# Patient Record
Sex: MALE | Race: WHITE | NOT HISPANIC OR LATINO | Employment: OTHER | URBAN - METROPOLITAN AREA
[De-identification: names, ages, dates, MRNs, and addresses within clinical notes are randomized per-mention and may not be internally consistent; named-entity substitution may affect disease eponyms.]

---

## 2017-10-12 ENCOUNTER — OFFICE VISIT (OUTPATIENT)
Dept: FAMILY MEDICINE | Facility: CLINIC | Age: 72
End: 2017-10-12
Payer: COMMERCIAL

## 2017-10-12 ENCOUNTER — RADIANT APPOINTMENT (OUTPATIENT)
Dept: GENERAL RADIOLOGY | Facility: CLINIC | Age: 72
End: 2017-10-12
Attending: FAMILY MEDICINE
Payer: COMMERCIAL

## 2017-10-12 VITALS
WEIGHT: 259 LBS | DIASTOLIC BLOOD PRESSURE: 86 MMHG | HEIGHT: 70 IN | BODY MASS INDEX: 37.08 KG/M2 | HEART RATE: 75 BPM | SYSTOLIC BLOOD PRESSURE: 138 MMHG | TEMPERATURE: 97.8 F | OXYGEN SATURATION: 99 %

## 2017-10-12 DIAGNOSIS — Z11.59 NEED FOR HEPATITIS C SCREENING TEST: ICD-10-CM

## 2017-10-12 DIAGNOSIS — R10.32 ABDOMINAL PAIN, LEFT LOWER QUADRANT: Primary | ICD-10-CM

## 2017-10-12 DIAGNOSIS — Z91.81 AT RISK FOR FALLING: ICD-10-CM

## 2017-10-12 DIAGNOSIS — K59.00 CONSTIPATION, UNSPECIFIED CONSTIPATION TYPE: ICD-10-CM

## 2017-10-12 DIAGNOSIS — R10.32 ABDOMINAL PAIN, LEFT LOWER QUADRANT: ICD-10-CM

## 2017-10-12 DIAGNOSIS — I10 HYPERTENSION GOAL BP (BLOOD PRESSURE) < 140/90: ICD-10-CM

## 2017-10-12 DIAGNOSIS — Z23 NEED FOR PROPHYLACTIC VACCINATION AGAINST STREPTOCOCCUS PNEUMONIAE (PNEUMOCOCCUS): ICD-10-CM

## 2017-10-12 LAB
ALBUMIN UR-MCNC: NEGATIVE MG/DL
ANION GAP SERPL CALCULATED.3IONS-SCNC: 6 MMOL/L (ref 3–14)
APPEARANCE UR: CLEAR
BASOPHILS # BLD AUTO: 0 10E9/L (ref 0–0.2)
BASOPHILS NFR BLD AUTO: 0.4 %
BILIRUB UR QL STRIP: NEGATIVE
BUN SERPL-MCNC: 15 MG/DL (ref 7–30)
CALCIUM SERPL-MCNC: 9.1 MG/DL (ref 8.5–10.1)
CHLORIDE SERPL-SCNC: 98 MMOL/L (ref 94–109)
CHOLEST SERPL-MCNC: 164 MG/DL
CO2 SERPL-SCNC: 31 MMOL/L (ref 20–32)
COLOR UR AUTO: YELLOW
CREAT SERPL-MCNC: 1.01 MG/DL (ref 0.66–1.25)
DIFFERENTIAL METHOD BLD: NORMAL
EOSINOPHIL # BLD AUTO: 0.4 10E9/L (ref 0–0.7)
EOSINOPHIL NFR BLD AUTO: 3.9 %
ERYTHROCYTE [DISTWIDTH] IN BLOOD BY AUTOMATED COUNT: 12.6 % (ref 10–15)
GFR SERPL CREATININE-BSD FRML MDRD: 73 ML/MIN/1.7M2
GLUCOSE SERPL-MCNC: 264 MG/DL (ref 70–99)
GLUCOSE UR STRIP-MCNC: 100 MG/DL
HCT VFR BLD AUTO: 43.3 % (ref 40–53)
HCV AB SERPL QL IA: NONREACTIVE
HDLC SERPL-MCNC: 24 MG/DL
HGB BLD-MCNC: 15.6 G/DL (ref 13.3–17.7)
HGB UR QL STRIP: NEGATIVE
KETONES UR STRIP-MCNC: NEGATIVE MG/DL
LDLC SERPL CALC-MCNC: 69 MG/DL
LEUKOCYTE ESTERASE UR QL STRIP: NEGATIVE
LYMPHOCYTES # BLD AUTO: 2.4 10E9/L (ref 0.8–5.3)
LYMPHOCYTES NFR BLD AUTO: 26.1 %
MCH RBC QN AUTO: 32.4 PG (ref 26.5–33)
MCHC RBC AUTO-ENTMCNC: 36 G/DL (ref 31.5–36.5)
MCV RBC AUTO: 90 FL (ref 78–100)
MONOCYTES # BLD AUTO: 0.6 10E9/L (ref 0–1.3)
MONOCYTES NFR BLD AUTO: 6.3 %
NEUTROPHILS # BLD AUTO: 5.8 10E9/L (ref 1.6–8.3)
NEUTROPHILS NFR BLD AUTO: 63.3 %
NITRATE UR QL: NEGATIVE
NONHDLC SERPL-MCNC: 140 MG/DL
PH UR STRIP: 6 PH (ref 5–7)
PLATELET # BLD AUTO: 192 10E9/L (ref 150–450)
POTASSIUM SERPL-SCNC: 4.3 MMOL/L (ref 3.4–5.3)
RBC # BLD AUTO: 4.82 10E12/L (ref 4.4–5.9)
SODIUM SERPL-SCNC: 135 MMOL/L (ref 133–144)
SOURCE: ABNORMAL
SP GR UR STRIP: <=1.005 (ref 1–1.03)
TRIGL SERPL-MCNC: 355 MG/DL
UROBILINOGEN UR STRIP-ACNC: 0.2 EU/DL (ref 0.2–1)
WBC # BLD AUTO: 9.2 10E9/L (ref 4–11)

## 2017-10-12 PROCEDURE — 85025 COMPLETE CBC W/AUTO DIFF WBC: CPT | Performed by: FAMILY MEDICINE

## 2017-10-12 PROCEDURE — 86803 HEPATITIS C AB TEST: CPT | Performed by: FAMILY MEDICINE

## 2017-10-12 PROCEDURE — 80061 LIPID PANEL: CPT | Performed by: FAMILY MEDICINE

## 2017-10-12 PROCEDURE — 36415 COLL VENOUS BLD VENIPUNCTURE: CPT | Performed by: FAMILY MEDICINE

## 2017-10-12 PROCEDURE — 81003 URINALYSIS AUTO W/O SCOPE: CPT | Performed by: FAMILY MEDICINE

## 2017-10-12 PROCEDURE — 99214 OFFICE O/P EST MOD 30 MIN: CPT | Performed by: FAMILY MEDICINE

## 2017-10-12 PROCEDURE — 74020 XR ABDOMEN 2 VW: CPT

## 2017-10-12 PROCEDURE — 80048 BASIC METABOLIC PNL TOTAL CA: CPT | Performed by: FAMILY MEDICINE

## 2017-10-12 RX ORDER — METRONIDAZOLE 500 MG/1
500 TABLET ORAL 3 TIMES DAILY
Qty: 30 TABLET | Refills: 0 | Status: SHIPPED | OUTPATIENT
Start: 2017-10-12 | End: 2017-10-22

## 2017-10-12 RX ORDER — CEFUROXIME AXETIL 500 MG/1
500 TABLET ORAL 2 TIMES DAILY
Qty: 20 TABLET | Refills: 0 | Status: SHIPPED | OUTPATIENT
Start: 2017-10-12 | End: 2017-10-22

## 2017-10-12 NOTE — PROGRESS NOTES
SUBJECTIVE:                                                    Neville Bonilla is a 72 year old male who presents to clinic today for the following health issues:  No abdominal pain    ABDOMINAL   PAIN     Onset: 3 days    Description:   Character: Sharp  Location: left lower quadrant  Radiation: None    Intensity: moderate    Progression of Symptoms:  worsening    Accompanying Signs & Symptoms:  Fever/Chills?: no   Gas/Bloating: no   Nausea: no   Vomitting: no   Diarrhea?: no   Constipation:YES- has Not had a Bowel Movement for 4 days  Dysuria or Hematuria: no    History:   Trauma: no   Previous similar pain: no    Previous tests done: none    Precipitating factors:   Does the pain change with:     Food: no      BM: no     Urination: no     Alleviating factors:      Therapies Tried and outcome: none             Hypertension Follow-up      Outpatient blood pressures are not being checked.    Low Salt Diet: no added salt          Problem list and histories reviewed & adjusted, as indicated.  Additional history: as documented    Patient Active Problem List   Diagnosis     CARDIOVASCULAR SCREENING; LDL GOAL LESS THAN 130     Hypertension goal BP (blood pressure) < 140/90     Obesity     HDL deficiency     Tear of medial meniscus of knee     S/P arthroscopy of right knee     Past Surgical History:   Procedure Laterality Date      KNEE SCOPE,MED/LAT MENISECTOMY  10/23/13    Right, with partial medial ONLY, chondoplasty      NASAL SURG PROC UNLISTED  1978     ROTATOR CUFF REPAIR RT/LT  1/29/1999    Left     TONSILLECTOMY & ADENOIDECTOMY  Age 13     URETEROSTOMY  8/2001       Social History   Substance Use Topics     Smoking status: Former Smoker     Years: 30.00     Quit date: 8/10/1993     Smokeless tobacco: Never Used     Alcohol use No     Family History   Problem Relation Age of Onset     HEART DISEASE Mother      DIABETES Father      Asthma Brother      Thyroid Disease Sister          Current Outpatient  "Prescriptions   Medication Sig Dispense Refill     NAPROXEN PO Take 250 mg by mouth       lisinopril-hydrochlorothiazide (PRINZIDE,ZESTORETIC) 10-12.5 MG per tablet Take 1 tablet by mouth daily       triamcinolone (NASACORT) 55 MCG/ACT nasal inhaler Spray 2 sprays into both nostrils 2 times daily 1 Bottle 1     aspirin 325 MG tablet Take by mouth at onset of headache       Multiple Vitamin (MULTI-VITAMIN) per tablet Take 1 tablet by mouth daily.       No Known Allergies  Recent Labs   Lab Test  10/17/13   1057  04/27/11   1227  08/17/10   0709   A1C   --    --   5.9   LDL   --    --   111   HDL   --    --   22*   TRIG   --    --   268*   CR  0.95  0.96  1.09   GFRESTIMATED  79  79  68   GFRESTBLACK  >90  >90  82   POTASSIUM  4.0  3.9  4.4      BP Readings from Last 3 Encounters:   10/12/17 (!) 140/100   04/09/16 133/79   03/08/16 140/80    Wt Readings from Last 3 Encounters:   10/12/17 259 lb (117.5 kg)   04/09/16 256 lb 12.8 oz (116.5 kg)   03/08/16 261 lb 6.4 oz (118.6 kg)                  Labs reviewed in EPIC          ROS:  C: NEGATIVE for fever, chills, change in weight  INTEGUMENTARY/SKIN: NEGATIVE for worrisome rashes, moles or lesions  E/M: NEGATIVE for ear, mouth and throat problems  R: NEGATIVE for significant cough or SOB  CV: NEGATIVE for chest pain, palpitations or peripheral edema  GI: as above  : negative for dysuria, hematuria, decreased urinary stream    OBJECTIVE:     BP (!) 140/100  Pulse 75  Temp 97.8  F (36.6  C) (Oral)  Ht 5' 10\" (1.778 m)  Wt 259 lb (117.5 kg)  SpO2 99%  BMI 37.16 kg/m2  Body mass index is 37.16 kg/(m^2).  GENERAL: healthy, alert and no distress  NECK: no adenopathy, no asymmetry, masses, or scars and thyroid normal to palpation  RESP: lungs clear to auscultation - no rales, rhonchi or wheezes  CV: regular rate and rhythm, normal S1 S2, no S3 or S4, no murmur, click or rub, no peripheral edema and peripheral pulses strong  ABDOMEN: tenderness LLQ, no organomegaly or " masses and bowel sounds normal  MS: no gross musculoskeletal defects noted, no edema  PSYCH: mentation appears normal, affect normal/bright    Diagnostic Test Results:  Results for orders placed or performed in visit on 10/12/17 (from the past 24 hour(s))   *UA reflex to Microscopic and Culture ([facility] (except [place] and [place])   Result Value Ref Range    Color Urine Yellow     Appearance Urine Clear     Glucose Urine 100 (A) NEG^Negative mg/dL    Bilirubin Urine Negative NEG^Negative    Ketones Urine Negative NEG^Negative mg/dL    Specific Gravity Urine <=1.005 1.003 - 1.035    Blood Urine Negative NEG^Negative    pH Urine 6.0 5.0 - 7.0 pH    Protein Albumin Urine Negative NEG^Negative mg/dL    Urobilinogen Urine 0.2 0.2 - 1.0 EU/dL    Nitrite Urine Negative NEG^Negative    Leukocyte Esterase Urine Negative NEG^Negative    Source Midstream Urine    CBC with platelets differential   Result Value Ref Range    WBC 9.2 4.0 - 11.0 10e9/L    RBC Count 4.82 4.4 - 5.9 10e12/L    Hemoglobin 15.6 13.3 - 17.7 g/dL    Hematocrit 43.3 40.0 - 53.0 %    MCV 90 78 - 100 fl    MCH 32.4 26.5 - 33.0 pg    MCHC 36.0 31.5 - 36.5 g/dL    RDW 12.6 10.0 - 15.0 %    Platelet Count 192 150 - 450 10e9/L    Diff Method Automated Method     % Neutrophils 63.3 %    % Lymphocytes 26.1 %    % Monocytes 6.3 %    % Eosinophils 3.9 %    % Basophils 0.4 %    Absolute Neutrophil 5.8 1.6 - 8.3 10e9/L    Absolute Lymphocytes 2.4 0.8 - 5.3 10e9/L    Absolute Monocytes 0.6 0.0 - 1.3 10e9/L    Absolute Eosinophils 0.4 0.0 - 0.7 10e9/L    Absolute Basophils 0.0 0.0 - 0.2 10e9/L       ASSESSMENT/PLAN:               1. Abdominal pain, left lower quadrant  Possible Diverticulitis   - CBC with platelets differential  - *UA reflex to Microscopic and Culture ([facility] (except [place] and [place])  - XR Abdomen 2 Views; Future  - cefuroxime (CEFTIN) 500 MG tablet; Take 1 tablet (500 mg) by mouth 2 times daily for 10  days  Dispense: 20 tablet; Refill: 0  - metroNIDAZOLE (FLAGYL) 500 MG tablet; Take 1 tablet (500 mg) by mouth 3 times daily for 10 days  Dispense: 30 tablet; Refill: 0  SEE EPIC care orders  The potential side effects of this medication have been discussed with the patient.  Call if any significant problems with these are experienced.  Liquid diet  Follow up PMD 2-3 days   If worse go to ER  For a CT scan   Pt declined CT scan Today  2. Hypertension goal BP (blood pressure) < 140/90  Was High  Pt on a diffferent BP medicines   [facility]  Advised To bring all his medicines and follow up with PMD next week for Blood Pressure Check  - BASIC METABOLIC PANEL  - Lipid panel reflex to direct LDL    3. Constipation, unspecified constipation type      4. Need for hepatitis C screening test    - Hepatitis C Screen Reflex to HCV RNA Quant and Genotype    5. At risk for falling    Ria Garibay MD  [facility]

## 2017-10-12 NOTE — PATIENT INSTRUCTIONS
[facility]    If you have any questions regarding to your visit please contact your care team:       Team Red:   Clinic Hours Telephone Number   Dr. Kelly David, NP   7am-7pm  Monday - Thursday   7am-5pm  Fridays  [phone]  (Appointment scheduling available 24/7)    Questions about your visit?   Team Line  [phone]   [facility] [place] [facility] 11am-9pm Monday-Friday Saturday-Sunday- 9am-5pm   [place] - 5pm-9pm Monday-Friday Saturday-Sunday- 9am-5pm  [phone] - [place]   [phone] - [place]       What options do I have for visits at the clinic other than the traditional office visit?  To expand how we care for you, many of our providers are utilizing electronic visits (e-visits) and telephone visits, when medically appropriate, for interactions with their patients rather than a visit in the clinic.   We also offer nurse visits for many medical concerns. Just like any other service, we will bill your insurance company for this type of visit based on time spent on the phone with your provider. Not all insurance companies cover these visits. Please check with your medical insurance if this type of visit is covered. You will be responsible for any charges that are not paid by your insurance.      E-visits via Sevence:  generally incur a $35.00 fee.  Telephone visits:  Time spent on the phone: *charged based on time that is spent on the phone in increments of 10 minutes. Estimated cost:   5-10 mins $30.00   11-20 mins. $59.00   21-30 mins. $85.00     Use Breezeworkst (secure email communication and access to your chart) to send your primary care provider a message or make an appointment. Ask someone on your Team how to sign up for Sevence.  For a Price Quote for your services, please call our Consumer Price Line at [phone].      As always, Thank you for trusting us with your health care needs!    Dawn  Tej, CMA

## 2017-10-12 NOTE — PROGRESS NOTES
Writer called pt regarding follow up appt with Dr ODOM as requested pt stated he would get around to scheduling appt some time soon again writer offered to schedule pt declined .      Dawn Burnham, JACLYN

## 2017-10-12 NOTE — NURSING NOTE
"Chief Complaint   Patient presents with     Hernia     PELVIC       Initial BP (!) 140/100  Pulse 75  Temp 97.8  F (36.6  C) (Oral)  Ht 5' 10\" (1.778 m)  Wt 259 lb (117.5 kg)  SpO2 99%  BMI 37.16 kg/m2 Estimated body mass index is 37.16 kg/(m^2) as calculated from the following:    Height as of this encounter: 5' 10\" (1.778 m).    Weight as of this encounter: 259 lb (117.5 kg).  Medication Reconciliation: complete       Dawn Burnham CMA      "

## 2017-10-12 NOTE — MR AVS SNAPSHOT
After Visit Summary   10/12/2017    Neville Bonilla    MRN: [id number]           Patient Information     Date Of Birth          1945        Visit Information        Provider Department      10/12/2017 10:20 AM Ria Garibay MD [facility]        Today's Diagnoses     Abdominal pain, left lower quadrant    -  1    Hypertension goal BP (blood pressure) < 140/90        Need for hepatitis C screening test        At risk for falling        Need for prophylactic vaccination against Streptococcus pneumoniae (pneumococcus)          Care Instructions    [facility]    If you have any questions regarding to your visit please contact your care team:       Team Red:   Clinic Hours Telephone Number   Dr. Kelly David, NP   7am-7pm  Monday - Thursday   7am-5pm  Fridays  [phone]  (Appointment scheduling available 24/7)    Questions about your visit?   Team Line  [phone]   [facility] [place] [facility] 11am-9pm Monday-Friday Saturday-Sunday- 9am-5pm   [place] - 5pm-9pm Monday-Friday Saturday-Sunday- 9am-5pm  [phone] - [facility]  [phone] - [place]       What options do I have for visits at the clinic other than the traditional office visit?  To expand how we care for you, many of our providers are utilizing electronic visits (e-visits) and telephone visits, when medically appropriate, for interactions with their patients rather than a visit in the clinic.   We also offer nurse visits for many medical concerns. Just like any other service, we will bill your insurance company for this type of visit based on time spent on the phone with your provider. Not all insurance companies cover these visits. Please check with your medical insurance if this type of visit is covered. You will be responsible for any charges that are not paid by your insurance.      E-visits via Perpetuall:  generally incur a  "$35.00 fee.  Telephone visits:  Time spent on the phone: *charged based on time that is spent on the phone in increments of 10 minutes. Estimated cost:   5-10 mins $30.00   11-20 mins. $59.00   21-30 mins. $85.00     Use Braingazehart (secure email communication and access to your chart) to send your primary care provider a message or make an appointment. Ask someone on your Team how to sign up for Flavourlyt.  For a Price Quote for your services, please call our [place] Line at [phone].      As always, Thank you for trusting us with your health care needs!    Dawn Burnham, JACLYN            Follow-ups after your visit        Who to contact     If you have questions or need follow up information about today's clinic visit or your schedule please contact [facility] directly at [phone].  Normal or non-critical lab and imaging results will be communicated to you by Braingazehart, letter or phone within 4 business days after the clinic has received the results. If you do not hear from us within 7 days, please contact the clinic through Braingazehart or phone. If you have a critical or abnormal lab result, we will notify you by phone as soon as possible.  Submit refill requests through PredictionIO or call your pharmacy and they will forward the refill request to us. Please allow 3 business days for your refill to be completed.          Additional Information About Your Visit        Braingazehart Information     Flavourlyt lets you send messages to your doctor, view your test results, renew your prescriptions, schedule appointments and more. To sign up, go to [email or web address] . Click on \"Log in\" on the left side of the screen, which will take you to the Welcome page. Then click on \"Sign up Now\" on the right side of the page.     You will be asked to enter the access code listed below, as well as some personal information. Please follow the directions to create your username and password.     Your access code is: " [email or web address]  Expires: 1/10/2018 11:43 AM     Your access code will  in 90 days. If you need help or a new code, please call your [place] clinic or [phone].        Care EveryWhere ID     This is your Care EveryWhere ID. This could be used by other organizations to access your [place] medical records  [id number]        Your Vitals Were     Pulse Temperature Height Pulse Oximetry BMI (Body Mass Index)       75 97.8  F (36.6  C) (Oral) 5' 10\" (1.778 m) 99% 37.16 kg/m2        Blood Pressure from Last 3 Encounters:   10/12/17 (!) 140/100   16 133/79   16 140/80    Weight from Last 3 Encounters:   10/12/17 259 lb (117.5 kg)   16 256 lb 12.8 oz (116.5 kg)   16 261 lb 6.4 oz (118.6 kg)              We Performed the Following     *UA reflex to Microscopic and Culture ([facility] (except [place] and [place])     BASIC METABOLIC PANEL     CBC with platelets differential     Hepatitis C Screen Reflex to HCV RNA Quant and Genotype     Lipid panel reflex to direct LDL          Today's Medication Changes          These changes are accurate as of: 10/12/17 11:43 AM.  If you have any questions, ask your nurse or doctor.               Start taking these medicines.        Dose/Directions    cefuroxime 500 MG tablet   Commonly known as:  CEFTIN   Used for:  Abdominal pain, left lower quadrant   Started by:  Ria Garibay MD        Dose:  500 mg   Take 1 tablet (500 mg) by mouth 2 times daily for 10 days   Quantity:  20 tablet   Refills:  0       metroNIDAZOLE 500 MG tablet   Commonly known as:  FLAGYL   Used for:  Abdominal pain, left lower quadrant   Started by:  Ria Garibay MD        Dose:  500 mg   Take 1 tablet (500 mg) by mouth 3 times daily for 10 days   Quantity:  30 tablet   Refills:  0            Where to get your medicines      These medications were sent to [facility] - [address]  [address], [place] [id number]     Phone:  [phone]     cefuroxime 500 MG tablet    metroNIDAZOLE 500 MG tablet                Primary Care Provider Office Phone # Fax #    Rich Butler [phone] [address] [address]        Equal Access to Services     BHARATH POWELL : Hadii aad ku hadasho Soomaali, waaxda luqadaha, qaybta kaalmada adeegyada, sue harris alexgayatri leonard scoutralph chauhan. So [facility] [phone].    ATENCIÓN: Si habla español, tiene a banks disposición servicios gratuitos de asistencia lingüística. [facility] [phone].    We comply with applicable federal civil rights laws and [place] laws. We do not discriminate on the basis of race, color, national origin, age, disability, sex, sexual orientation, or gender identity.            Thank you!     Thank you for choosing [facility]  for your care. Our goal is always to provide you with excellent care. Hearing back from our patients is one way we can continue to improve our services. Please take a few minutes to complete the written survey that you may receive in the mail after your visit with us. Thank you!             Your Updated Medication List - Protect others around you: Learn how to safely use, store and throw away your medicines at www.disposemymeds.org.          This list is accurate as of: 10/12/17 11:43 AM.  Always use your most recent med list.                   Brand Name Dispense Instructions for use Diagnosis    aspirin 325 MG tablet      Take by mouth at onset of headache        cefuroxime 500 MG tablet    CEFTIN    20 tablet    Take 1 tablet (500 mg) by mouth 2 times daily for 10 days    Abdominal pain, left lower quadrant       lisinopril-hydrochlorothiazide 10-12.5 MG per tablet    PRINZIDE/ZESTORETIC     Take 1 tablet by mouth daily        metroNIDAZOLE 500 MG tablet    FLAGYL    30 tablet    Take 1 tablet (500 mg) by mouth 3 times daily for 10 days    Abdominal pain, left lower quadrant        Multi-vitamin Tabs tablet   Generic drug:  multivitamin, therapeutic with minerals      Take 1 tablet by mouth daily.    Hypertension goal BP (blood pressure) < 140/90       NAPROXEN PO      Take 250 mg by mouth        triamcinolone 55 MCG/ACT Inhaler    NASACORT    1 Bottle    Spray 2 sprays into both nostrils 2 times daily    Acute sinusitis, recurrence not specified, unspecified location, Cough

## 2017-10-13 PROBLEM — E11.9 TYPE 2 DIABETES MELLITUS WITHOUT COMPLICATION (H): Status: ACTIVE | Noted: 2017-10-13

## 2017-10-16 ENCOUNTER — OFFICE VISIT (OUTPATIENT)
Dept: FAMILY MEDICINE | Facility: CLINIC | Age: 72
End: 2017-10-16
Payer: COMMERCIAL

## 2017-10-16 VITALS
BODY MASS INDEX: 36.3 KG/M2 | DIASTOLIC BLOOD PRESSURE: 80 MMHG | SYSTOLIC BLOOD PRESSURE: 140 MMHG | OXYGEN SATURATION: 97 % | WEIGHT: 253 LBS | TEMPERATURE: 97.3 F | HEART RATE: 73 BPM

## 2017-10-16 DIAGNOSIS — K57.32 DIVERTICULITIS OF COLON: Primary | ICD-10-CM

## 2017-10-16 PROCEDURE — 99213 OFFICE O/P EST LOW 20 MIN: CPT | Performed by: FAMILY MEDICINE

## 2017-10-16 RX ORDER — LOSARTAN POTASSIUM 100 MG/1
TABLET ORAL
COMMUNITY
Start: 2017-09-19

## 2017-10-16 NOTE — PATIENT INSTRUCTIONS
Diverticulitis    Some people get pouches along the wall of the colon as they get older. The pouches, called diverticuli, usually cause no symptoms. If the pouches become blocked, you can get an infection. This infection is called diverticulitis. It causes pain in your lower abdomen and fever. If not treated, it can become a serious condition, causing an abscess to form inside the pouch. The abscess may block the intestinal tract even or rupture, spreading infection throughout the abdomen.  When treatment is started early, oral antibiotics alone may be enough to cure diverticulitis. This method is tried first. But, if you don't improve or if your condition gets worse while using oral antibiotics, you may need to be admitted to the hospital for IV antibiotics. Severe cases may require surgery.  Home care  The following guidelines will help you care for yourself at home:    During the acute illness, rest and follow your healthcare provider's instructions about diet. Sometimes you will need to follow a clear liquid diet to rest your bowel. Once your symptoms are better, you may be told to follow a low-fiber diet for some time. Include foods like:    Flake cereal, mashed potatoes, pancakes, waffles, pasta, white bread, rice, applesauce, bananas, eggs, fish, poultry, tofu, and cooked soft vegetables    Take antibiotics exactly as instructed. Don't miss any doses or stop taking the medication, even if you feel better.    Monitor your temperature and tell your healthcare provider if you have rising temperatures.  Preventing future attacks  Once you have an episode of diverticulitis, you are at risk for having it again. After you have recovered from this episode, you may be able to lower your risk by eating a high-fiber diet (20 gm/day to 35 gm/day of fiber). This cleans out the colon pouches that already exist and may prevent new ones from forming. Foods high in fiber include fresh fruits and edible peelings, raw or  lightly cooked vegetables, whole grain cereals and breads, dried beans and peas, and bran.  Other steps that can help prevent future attacks include:    Take your medicines, such as antibiotics, as your healthcare provider says.    Drink 6 to 8 glasses of water every day, unless told otherwise.    Use a heating pad or hot water bottle to help abdominal cramping or pain.    Begin an exercise program. Ask your healthcare provider how to get started. You can benefit from simple activities such as walking or gardening.    Treat diarrhea with a bland diet. Start with liquids only; then slowly add fiber over time.    Watch for changes in your bowel movements (constipation to diarrhea). Avoid constipation by eating a high fiber diet and taking a stool softener if needed.    Get plenty of rest and sleep.  Follow-up care  Follow up with your healthcare provider as advised or sooner if you are not getting better in the next 2 days.  When to seek medical advice  Call your healthcare provider right away if any of these occur:    Fever of 100.4 F (38 C) or higher, or as directed by your healthcare provider    Repeated vomiting or swelling of the abdomen    Weakness, dizziness, light-headedness    Pain in your abdomen that gets worse, severe, or spreads to your back    Pain that moves to the right lower abdomen    Rectal bleeding (stools that are red, black or maroon color)    Unexpected vaginal bleeding  Date Last Reviewed: 9/1/2016 2000-2017 [employer]. [address] [place] [address]. All rights reserved. This information is not intended as a substitute for professional medical care. Always follow your healthcare professional's instructions.

## 2017-10-16 NOTE — PROGRESS NOTES
SUBJECTIVE:   Neville Bonilla is a 72 year old male who presents to clinic today for the following health issues:    Follow up from Dr. Garibay on 10/12/2017  ABDOMINAL   PAIN     Onset: x1 week     Description:   Character: Sharp  Location: left lower quadrant  Radiation: Down the left leg     Intensity: mild    Progression of Symptoms:  worsening    Accompanying Signs & Symptoms:  Fever/Chills?: no   Gas/Bloating: no   Nausea: no   Vomitting: no   Diarrhea?: no   Constipation:YES  Dysuria or Hematuria: no    History:   Trauma: no   Previous similar pain: no    Previous tests done: x-ray    Precipitating factors:   Does the pain change with:     Food: YES- sometimes     BM: no     Urination: no     Alleviating factors:  Meds are helping     Therapies Tried and outcome: meds     LMP:  not applicable       Current Outpatient Prescriptions   Medication Sig Dispense Refill     losartan (COZAAR) 100 MG tablet        cefuroxime (CEFTIN) 500 MG tablet Take 1 tablet (500 mg) by mouth 2 times daily for 10 days 20 tablet 0     metroNIDAZOLE (FLAGYL) 500 MG tablet Take 1 tablet (500 mg) by mouth 3 times daily for 10 days 30 tablet 0     triamcinolone (NASACORT) 55 MCG/ACT nasal inhaler Spray 2 sprays into both nostrils 2 times daily 1 Bottle 1     Multiple Vitamin (MULTI-VITAMIN) per tablet Take 1 tablet by mouth daily.       NAPROXEN PO Take 250 mg by mouth       lisinopril-hydrochlorothiazide (PRINZIDE,ZESTORETIC) 10-12.5 MG per tablet Take 1 tablet by mouth daily       aspirin 325 MG tablet Take by mouth at onset of headache       Patient did not have a bowel obstruction       Problem list and histories reviewed & adjusted, as indicated.  Additional history: as documented    Current Outpatient Prescriptions   Medication Sig Dispense Refill     losartan (COZAAR) 100 MG tablet        cefuroxime (CEFTIN) 500 MG tablet Take 1 tablet (500 mg) by mouth 2 times daily for 10 days 20 tablet 0     metroNIDAZOLE (FLAGYL) 500 MG tablet  Take 1 tablet (500 mg) by mouth 3 times daily for 10 days 30 tablet 0     triamcinolone (NASACORT) 55 MCG/ACT nasal inhaler Spray 2 sprays into both nostrils 2 times daily 1 Bottle 1     Multiple Vitamin (MULTI-VITAMIN) per tablet Take 1 tablet by mouth daily.       NAPROXEN PO Take 250 mg by mouth       lisinopril-hydrochlorothiazide (PRINZIDE,ZESTORETIC) 10-12.5 MG per tablet Take 1 tablet by mouth daily       aspirin 325 MG tablet Take by mouth at onset of headache       No Known Allergies  Recent Labs   Lab Test  10/12/17   1048  10/17/13   1057   08/17/10   0709   A1C   --    --    --   5.9   LDL  69   --    --   111   HDL  24*   --    --   22*   TRIG  355*   --    --   268*   CR  1.01  0.95   < >  1.09   GFRESTIMATED  73  79   < >  68   GFRESTBLACK  88  >90   < >  82   POTASSIUM  4.3  4.0   < >  4.4    < > = values in this interval not displayed.      BP Readings from Last 3 Encounters:   10/16/17 158/89   10/12/17 138/86   04/09/16 133/79    Wt Readings from Last 3 Encounters:   10/16/17 253 lb (114.8 kg)   10/12/17 259 lb (117.5 kg)   04/09/16 256 lb 12.8 oz (116.5 kg)          O; /80  Pulse 73  Temp 97.3  F (36.3  C) (Oral)  Wt 253 lb (114.8 kg)  SpO2 97%  BMI 36.3 kg/m2    Chest wall normal to inspection and palpation. Good excursion bilaterally. Lungs clear to auscultation. Good air movement bilaterally without rales, wheezes, or rhonchi.   Regular rate and  rhythm. S1 and S2 normal, no murmurs, clicks, gallops or rubs. No edema or JVD.    The abdomen is soft with LLQ tenderness,no  guarding, mass,slight rebound or organomegaly. Bowel sounds are normal. No CVA tenderness or inguinal adenopathy noted.            ICD-10-CM    1. Diverticulitis of colon K57.32      [facility]   [facility]      Reviewed and updated as needed this visit by clinical staff     Reviewed and updated as needed this visit by Provider

## 2017-10-16 NOTE — MR AVS SNAPSHOT
After Visit Summary   10/16/2017    Neville Bonilla    MRN: [id number]           Patient Information     Date Of Birth          1945        Visit Information        Provider Department      10/16/2017 9:40 AM Rich Butler MD [facility]        Today's Diagnoses     Diverticulitis of colon    -  1      Care Instructions      Diverticulitis    Some people get pouches along the wall of the colon as they get older. The pouches, called diverticuli, usually cause no symptoms. If the pouches become blocked, you can get an infection. This infection is called diverticulitis. It causes pain in your lower abdomen and fever. If not treated, it can become a serious condition, causing an abscess to form inside the pouch. The abscess may block the intestinal tract even or rupture, spreading infection throughout the abdomen.  When treatment is started early, oral antibiotics alone may be enough to cure diverticulitis. This method is tried first. But, if you don't improve or if your condition gets worse while using oral antibiotics, you may need to be admitted to the hospital for IV antibiotics. Severe cases may require surgery.  Home care  The following guidelines will help you care for yourself at home:    During the acute illness, rest and follow your healthcare provider's instructions about diet. Sometimes you will need to follow a clear liquid diet to rest your bowel. Once your symptoms are better, you may be told to follow a low-fiber diet for some time. Include foods like:    Flake cereal, mashed potatoes, pancakes, waffles, pasta, white bread, rice, applesauce, bananas, eggs, fish, poultry, tofu, and cooked soft vegetables    Take antibiotics exactly as instructed. Don't miss any doses or stop taking the medication, even if you feel better.    Monitor your temperature and tell your healthcare provider if you have rising temperatures.  Preventing future attacks  Once you have an  episode of diverticulitis, you are at risk for having it again. After you have recovered from this episode, you may be able to lower your risk by eating a high-fiber diet (20 gm/day to 35 gm/day of fiber). This cleans out the colon pouches that already exist and may prevent new ones from forming. Foods high in fiber include fresh fruits and edible peelings, raw or lightly cooked vegetables, whole grain cereals and breads, dried beans and peas, and bran.  Other steps that can help prevent future attacks include:    Take your medicines, such as antibiotics, as your healthcare provider says.    Drink 6 to 8 glasses of water every day, unless told otherwise.    Use a heating pad or hot water bottle to help abdominal cramping or pain.    Begin an exercise program. Ask your healthcare provider how to get started. You can benefit from simple activities such as walking or gardening.    Treat diarrhea with a bland diet. Start with liquids only; then slowly add fiber over time.    Watch for changes in your bowel movements (constipation to diarrhea). Avoid constipation by eating a high fiber diet and taking a stool softener if needed.    Get plenty of rest and sleep.  Follow-up care  Follow up with your healthcare provider as advised or sooner if you are not getting better in the next 2 days.  When to seek medical advice  Call your healthcare provider right away if any of these occur:    Fever of 100.4 F (38 C) or higher, or as directed by your healthcare provider    Repeated vomiting or swelling of the abdomen    Weakness, dizziness, light-headedness    Pain in your abdomen that gets worse, severe, or spreads to your back    Pain that moves to the right lower abdomen    Rectal bleeding (stools that are red, black or maroon color)    Unexpected vaginal bleeding  Date Last Reviewed: 9/1/2016 2000-2017 [employer]. [address] [place] [address], [place] [address]. All rights reserved. This information is not intended  "as a substitute for professional medical care. Always follow your healthcare professional's instructions.                Follow-ups after your visit        Who to contact     If you have questions or need follow up information about today's clinic visit or your schedule please contact [facility] directly at [phone].  Normal or non-critical lab and imaging results will be communicated to you by MyChart, letter or phone within 4 business days after the clinic has received the results. If you do not hear from us within 7 days, please contact the clinic through MyChart or phone. If you have a critical or abnormal lab result, we will notify you by phone as soon as possible.  Submit refill requests through [employer] or call your pharmacy and they will forward the refill request to us. Please allow 3 business days for your refill to be completed.          Additional Information About Your Visit        MyChart Information     [employer] lets you send messages to your doctor, view your test results, renew your prescriptions, schedule appointments and more. To sign up, go to [email or web address] . Click on \"Log in\" on the left side of the screen, which will take you to the Welcome page. Then click on \"Sign up Now\" on the right side of the page.     You will be asked to enter the access code listed below, as well as some personal information. Please follow the directions to create your username and password.     Your access code is: [email or web address]  Expires: 1/10/2018 11:43 AM     Your access code will  in 90 days. If you need help or a new code, please call your [facility] or [phone].        Care EveryWhere ID     This is your Care EveryWhere ID. This could be used by other organizations to access your [place] medical records  [id number]        Your Vitals Were     Pulse Temperature Pulse Oximetry BMI (Body Mass Index)          73 97.3  F (36.3  C) (Oral) 97% 36.3 kg/m2         Blood " Pressure from Last 3 Encounters:   10/16/17 140/80   10/12/17 138/86   04/09/16 133/79    Weight from Last 3 Encounters:   10/16/17 253 lb (114.8 kg)   10/12/17 259 lb (117.5 kg)   04/09/16 256 lb 12.8 oz (116.5 kg)              Today, you had the following     No orders found for display       Primary Care Provider Office Phone # Fax #    Rich Butler [phone]       [address] [facility] [address]        Equal Access to Services     [facility]: Hadii nyasia marcial hadjarvis Socheyenne, waaxda luqadaha, qaybta kaalmada clifton, sue rizzo . So [facility] [phone].    ATENCIÓN: Si habla español, tiene a banks disposición servicios gratuitos de asistencia lingüística. Llame al [phone].    We comply with applicable federal civil rights laws and [place] laws. We do not discriminate on the basis of race, color, national origin, age, disability, sex, sexual orientation, or gender identity.            Thank you!     Thank you for choosing [facility]  for your care. Our goal is always to provide you with excellent care. Hearing back from our patients is one way we can continue to improve our services. Please take a few minutes to complete the written survey that you may receive in the mail after your visit with us. Thank you!             Your Updated Medication List - Protect others around you: Learn how to safely use, store and throw away your medicines at www.disposemymeds.org.          This list is accurate as of: 10/16/17 10:37 AM.  Always use your most recent med list.                   Brand Name Dispense Instructions for use Diagnosis    aspirin 325 MG tablet      Take by mouth at onset of headache        cefuroxime 500 MG tablet    CEFTIN    20 tablet    Take 1 tablet (500 mg) by mouth 2 times daily for 10 days    Abdominal pain, left lower quadrant       lisinopril-hydrochlorothiazide 10-12.5 MG per tablet     PRINZIDE/ZESTORETIC     Take 1 tablet by mouth daily        losartan 100 MG tablet    COZAAR          metroNIDAZOLE 500 MG tablet    FLAGYL    30 tablet    Take 1 tablet (500 mg) by mouth 3 times daily for 10 days    Abdominal pain, left lower quadrant       Multi-vitamin Tabs tablet   Generic drug:  multivitamin, therapeutic with minerals      Take 1 tablet by mouth daily.    Hypertension goal BP (blood pressure) < 140/90       NAPROXEN PO      Take 250 mg by mouth        triamcinolone 55 MCG/ACT Inhaler    NASACORT    1 Bottle    Spray 2 sprays into both nostrils 2 times daily    Acute sinusitis, recurrence not specified, unspecified location, [place]

## 2018-01-19 LAB
CREAT SERPL-MCNC: 1 MG/DL (ref 0.7–1.2)
GFR SERPL CREATININE-BSD FRML MDRD: >60 ML/MIN/1.73M2
HBA1C MFR BLD: 6.1 % (ref 4–6)

## 2018-07-31 LAB — HBA1C MFR BLD: 6.6 % (ref 4–6)

## 2018-11-05 ENCOUNTER — OFFICE VISIT (OUTPATIENT)
Dept: FAMILY MEDICINE | Facility: CLINIC | Age: 73
End: 2018-11-05
Payer: COMMERCIAL

## 2018-11-05 VITALS
HEART RATE: 107 BPM | SYSTOLIC BLOOD PRESSURE: 133 MMHG | BODY MASS INDEX: 31.98 KG/M2 | RESPIRATION RATE: 16 BRPM | WEIGHT: 223.4 LBS | HEIGHT: 70 IN | OXYGEN SATURATION: 98 % | DIASTOLIC BLOOD PRESSURE: 71 MMHG | TEMPERATURE: 100.7 F

## 2018-11-05 DIAGNOSIS — R30.0 DYSURIA: ICD-10-CM

## 2018-11-05 DIAGNOSIS — E78.5 HYPERLIPIDEMIA LDL GOAL <70: ICD-10-CM

## 2018-11-05 DIAGNOSIS — L29.89 PRURITIC ERYTHEMATOUS RASH: ICD-10-CM

## 2018-11-05 DIAGNOSIS — R50.9 FEVER, UNSPECIFIED FEVER CAUSE: ICD-10-CM

## 2018-11-05 DIAGNOSIS — N39.0 URINARY TRACT INFECTION WITH HEMATURIA, SITE UNSPECIFIED: Primary | ICD-10-CM

## 2018-11-05 DIAGNOSIS — E11.9 TYPE 2 DIABETES MELLITUS WITHOUT COMPLICATION, UNSPECIFIED WHETHER LONG TERM INSULIN USE (H): ICD-10-CM

## 2018-11-05 DIAGNOSIS — I10 HYPERTENSION GOAL BP (BLOOD PRESSURE) < 140/90: ICD-10-CM

## 2018-11-05 DIAGNOSIS — R31.9 URINARY TRACT INFECTION WITH HEMATURIA, SITE UNSPECIFIED: Primary | ICD-10-CM

## 2018-11-05 LAB
ALBUMIN UR-MCNC: NEGATIVE MG/DL
APPEARANCE UR: ABNORMAL
BACTERIA #/AREA URNS HPF: ABNORMAL /HPF
BILIRUB UR QL STRIP: NEGATIVE
COLOR UR AUTO: YELLOW
GLUCOSE UR STRIP-MCNC: NEGATIVE MG/DL
HGB UR QL STRIP: ABNORMAL
KETONES UR STRIP-MCNC: NEGATIVE MG/DL
LEUKOCYTE ESTERASE UR QL STRIP: ABNORMAL
MUCOUS THREADS #/AREA URNS LPF: PRESENT /LPF
NITRATE UR QL: NEGATIVE
PH UR STRIP: 5.5 PH (ref 5–7)
RBC #/AREA URNS AUTO: ABNORMAL /HPF
SOURCE: ABNORMAL
SP GR UR STRIP: 1.02 (ref 1–1.03)
UROBILINOGEN UR STRIP-ACNC: 0.2 EU/DL (ref 0.2–1)
WBC #/AREA URNS AUTO: ABNORMAL /HPF

## 2018-11-05 PROCEDURE — 99214 OFFICE O/P EST MOD 30 MIN: CPT | Performed by: FAMILY MEDICINE

## 2018-11-05 PROCEDURE — 81001 URINALYSIS AUTO W/SCOPE: CPT | Performed by: FAMILY MEDICINE

## 2018-11-05 RX ORDER — BETAMETHASONE DIPROPIONATE 0.5 MG/G
CREAM TOPICAL
Qty: 45 G | Refills: 1 | Status: SHIPPED | OUTPATIENT
Start: 2018-11-05 | End: 2023-07-28

## 2018-11-05 RX ORDER — CIPROFLOXACIN 500 MG/1
500 TABLET, FILM COATED ORAL 2 TIMES DAILY
Qty: 28 TABLET | Refills: 0 | Status: SHIPPED | OUTPATIENT
Start: 2018-11-05 | End: 2020-02-05

## 2018-11-05 ASSESSMENT — PAIN SCALES - GENERAL: PAINLEVEL: MILD PAIN (3)

## 2018-11-05 NOTE — PATIENT INSTRUCTIONS
" [facility]    If you have any questions regarding to your visit please contact your care team:       Team Purple:   Clinic Hours Telephone Number   Dr. Whitney Tripp   7am-7pm  Monday - Thursday   7am-5pm  Fridays  [phone]  (Appointment scheduling available 24/7)   Urgent Care - [place] and [facility] - 11am-9pm Monday-Friday Saturday-Sunday- 9am-5pm   [place] - 5pm-9pm Monday-Friday Saturday-Sunday- 9am-5pm  [phone] - [place]  [phone] - [place]       What options do I have for a visit other than an office visit? We offer electronic visits (e-visits) and telephone visits, when medically appropriate.  Please check with your medical insurance to see if these types of visits are covered, as you will be responsible for any charges that are not paid by your insurance.      You can use [employer] (secure electronic communication) to access to your chart, send your primary care provider a message, or make an appointment. Ask a team member how to get started.     For a price quote for your services, please call our Consumer Price Line at [phone] or our Imaging Cost estimation line at [phone] (for imaging tests).    Kaleb Walsh    * BLADDER INFECTION: Male (Adult)    A bladder infection (\"cystitis\" or \"UTI\") usually causes a constant urge to urinate, and a burning when passing urine. Urine may be cloudy, smelly or dark. There may be also be pain in the lower abdomen.  Cystitis in males is not common. It may be caused by a partial blockage in the urinary system that keeps the bladder from emptying completely. This is most often related to an enlarged prostate gland.  HOME CARE:  1. Drink lots of fluids (at least 6-8 glasses a day). This will flush the bacteria out of your bladder. Cranberry juice has been shown to help clear out the bacteria.  2. Avoid sexual intercourse until your symptoms are gone.  3. A bladder " infection is treated with antibiotics. You may also be given Pyridium (generic - phenazopyridine) to reduce burning with urination. This will cause urine to become a bright orange color, which can stain clothing.  FOLLOW UP with your doctor or this facility if ALL symptoms have not cleared within five days. It is important to keep your follow up appointment to discuss with your doctor the need for further tests of the urinary tract.  GET PROMPT MEDICAL ATTENTION if any of the following occur:    Fever over 101  F (38.3  C)    No improvement by the third day of treatment    Increasing back or abdominal pain    Repeated vomiting; unable to keep medicine down    Weakness, dizziness or fainting    7978-2881 [employer]. [address] [place] [address], [place] [address]. All rights reserved. This information is not intended as a substitute for professional medical care. Always follow your healthcare professional's instructions.  This information has been modified by your health care provider with permission from the publisher.

## 2018-11-05 NOTE — PROGRESS NOTES
SUBJECTIVE:   Neville Bonilla is a 73 year old male who presents to clinic today for the following health issues:    Genitourinary - Male  Onset: X 3 DAYS    Description:   Dysuria (painful urination): YES- sometimes  Hematuria (blood in urine): YES  Frequency: YES  Are you urinating at night : YES  Hesitancy (delay in urine): no   Retention (unable to empty): no   Decrease in urinary flow: YES  Incontinence: YES    Progression of Symptoms:  worsening    Accompanying Signs & Symptoms:  Fever: YES  Back/Flank pain: no   Urethral discharge: no   Testicle lumps/masses/pain: no   Nausea and/or vomiting: no   Abdominal pain: YES    History:   History of frequent UTI's: no   History of kidney stones: YES  History of hernias: no   Personal or Family history of Prostate problems: no  Sexually active: YES    Precipitating factors:   none    Alleviating factors:  None    Rash:  He has a rash that is itchy in the arms and trunk  Tried hydrocortisone but not helping.  Wondering if is poison/oak ivy     Diabetes Mellitus type 2:   Been good; but does not check BS  [facility].  Last blood spring A1c was in 6 range.  Metformin: gave him instant diarrhea. Stopped. Has lost weight/2 pant sizes since last spring and is intentional     HTN:  Taking Losartan for BP  BP Readings from Last 3 Encounters:   11/05/18 133/71   10/16/17 140/80   10/12/17 138/86     Problem list and histories reviewed & adjusted, as indicated.  Additional history: as documented    Patient Active Problem List   Diagnosis     CARDIOVASCULAR SCREENING; LDL GOAL LESS THAN 130     Hypertension goal BP (blood pressure) < 140/90     Obesity     HDL deficiency     Tear of medial meniscus of knee     S/P arthroscopy of right knee     Type 2 diabetes mellitus without complication (H)     Past Surgical History:   Procedure Laterality Date      KNEE SCOPE,MED/LAT MENISECTOMY  10/23/13    Right, with partial medial ONLY, chondoplasty  "    HC NASAL SURG PROC UNLISTED  1978     ROTATOR CUFF REPAIR RT/LT  1/29/1999    Left     TONSILLECTOMY & ADENOIDECTOMY  Age 13     URETEROSTOMY  8/2001       Social History   Substance Use Topics     Smoking status: Former Smoker     Years: 30.00     Quit date: 8/10/1993     Smokeless tobacco: Never Used     Alcohol use No     Family History   Problem Relation Age of Onset     HEART DISEASE Mother      Diabetes Father      Asthma Brother      Thyroid Disease Sister          Reviewed and updated as needed this visit by Provider    ROS:  Constitutional, HEENT, cardiovascular, pulmonary and gi systems are negative, except as otherwise noted.    OBJECTIVE:     /71  Pulse 107  Temp 100.7  F (38.2  C) (Oral)  Resp 16  Ht 5' 10\" (1.778 m)  Wt 223 lb 6.4 oz (101.3 kg)  SpO2 98%  BMI 32.05 kg/m2  Body mass index is 32.05 kg/(m^2).  GENERAL: healthy, alert and no distress  NECK: no adenopathy and thyroid normal to palpation  SKIN: Erythematous bumps in arm, confluent rash in the sides of midback  RESP: lungs clear to auscultation - no rales, rhonchi or wheezes  CV: regular rate and rhythm, normal S1 S2, no S3 or S4, no murmur, click or rub  ABDOMEN: soft, nontender, no masses and bowel sounds normal  MS: no gross musculoskeletal defects noted, no edema    Diagnostic Test Results:  Results for orders placed or performed in visit on 11/05/18   *UA reflex to Microscopic and Culture ([place] and [facility] (except [place] and [place])   Result Value Ref Range    Color Urine Yellow     Appearance Urine Slightly Cloudy     Glucose Urine Negative NEG^Negative mg/dL    Bilirubin Urine Negative NEG^Negative    Ketones Urine Negative NEG^Negative mg/dL    Specific Gravity Urine 1.025 1.003 - 1.035    Blood Urine Moderate (A) NEG^Negative    pH Urine 5.5 5.0 - 7.0 pH    Protein Albumin Urine Negative NEG^Negative mg/dL    Urobilinogen Urine 0.2 0.2 - 1.0 EU/dL    Nitrite Urine Negative NEG^Negative    Leukocyte " Esterase Urine Small (A) NEG^Negative    Source Midstream Urine    Urine Microscopic   Result Value Ref Range    WBC Urine 10-25 (A) OTO5^0 - 5 /HPF    RBC Urine 10-25 (A) OTO2^O - 2 /HPF    Bacteria Urine Moderate (A) NEG^Negative /HPF    Mucous Urine Present (A) NEG^Negative /LPF     ASSESSMENT/PLAN:     (N39.0,  R31.9) Urinary tract infection with hematuria, site unspecified  (primary encounter diagnosis)  Comment: UA consistent with UTI. Antibiotic  Plan: ciprofloxacin (CIPRO) 500 MG tablet    (R30.0) Dysuria  Comment: UTI  Plan: *UA reflex to Microscopic and Culture ([place]         and [facility] (except [place] and         [place]), Urine Microscopic    (R50.9) Fever, unspecified fever cause  Comment: Likely from UTI. Check CBC   Plan: CBC with platelets differential    (L29.8) Pruritic erythematous rash  Comment: Appears to be allergic, will do a more potent steroid  Plan: betamethasone dipropionate (DIPROSONE) 0.05 %         cream    (E11.9) Type 2 diabetes mellitus without complication, unspecified whether long term insulin use (H)  Comment: Due for A1c and LDL.  Plan: Hemoglobin A1c, LDL cholesterol direct    (I10) Hypertension goal BP (blood pressure) < 140/90  Comment: BP at goal. Due for BMP check  Plan: Basic metabolic panel          Return in about 4 weeks (around 12/3/2018) for Follow up with PCP.    Yaw Melendez MD  [facility]

## 2018-11-05 NOTE — MR AVS SNAPSHOT
After Visit Summary   11/5/2018    Neville Bonilla    MRN: [id number]           Patient Information     Date Of Birth          1945        Visit Information        Provider Department      11/5/2018 1:40 PM Yaw Melendez MD [facility]        Today's Diagnoses     Urinary tract infection with hematuria, site unspecified    -  1    Dysuria        Fever, unspecified fever cause        Pruritic erythematous rash        Type 2 diabetes mellitus without complication, unspecified whether long term insulin use (H)        Hypertension goal BP (blood pressure) < 140/90        Hyperlipidemia LDL goal <70          Care Instructions     [facility]    If you have any questions regarding to your visit please contact your care team:       Team Purple:   Clinic Hours Telephone Number   Dr. Whitney Tripp   7am-7pm  Monday - Thursday   7am-5pm  Fridays  [phone]  (Appointment scheduling available 24/7)   Urgent Care - [place] and [facility] - 11am-9pm Monday-Friday Saturday-Sunday- 9am-5pm   [place] - 5pm-9pm Monday-Friday Saturday-Sunday- 9am-5pm  [phone] - [place]  [phone] - [place]       What options do I have for a visit other than an office visit? We offer electronic visits (e-visits) and telephone visits, when medically appropriate.  Please check with your medical insurance to see if these types of visits are covered, as you will be responsible for any charges that are not paid by your insurance.      You can use [employer] (secure electronic communication) to access to your chart, send your primary care provider a message, or make an appointment. Ask a team member how to get started.     For a price quote for your services, please call our Consumer Price Line at [phone] or our Imaging Cost estimation line at [phone] (for imaging tests).    Kaleb Walsh    * BLADDER INFECTION: Male  "(Adult)    A bladder infection (\"cystitis\" or \"UTI\") usually causes a constant urge to urinate, and a burning when passing urine. Urine may be cloudy, smelly or dark. There may be also be pain in the lower abdomen.  Cystitis in males is not common. It may be caused by a partial blockage in the urinary system that keeps the bladder from emptying completely. This is most often related to an enlarged prostate gland.  HOME CARE:  1. Drink lots of fluids (at least 6-8 glasses a day). This will flush the bacteria out of your bladder. Cranberry juice has been shown to help clear out the bacteria.  2. Avoid sexual intercourse until your symptoms are gone.  3. A bladder infection is treated with antibiotics. You may also be given Pyridium (generic - phenazopyridine) to reduce burning with urination. This will cause urine to become a bright orange color, which can stain clothing.  FOLLOW UP with your doctor or this facility if ALL symptoms have not cleared within five days. It is important to keep your follow up appointment to discuss with your doctor the need for further tests of the urinary tract.  GET PROMPT MEDICAL ATTENTION if any of the following occur:    Fever over 101  F (38.3  C)    No improvement by the third day of treatment    Increasing back or abdominal pain    Repeated vomiting; unable to keep medicine down    Weakness, dizziness or fainting    0232-7555 [employer]. [address] [place] [address] 97184. All rights reserved. This information is not intended as a substitute for professional medical care. Always follow your healthcare professional's instructions.  This information has been modified by your health care provider with permission from the publisher.            Follow-ups after your visit        Follow-up notes from your care team     Return in about 4 weeks (around 12/3/2018) for Follow up with PCP.      Future tests that were ordered for you today     Open Future Orders        " "Priority Expected Expires Ordered    CBC with platelets differential Routine  11/5/2019 11/5/2018    Hemoglobin A1c Routine  11/5/2019 11/5/2018    LDL cholesterol direct Routine  11/5/2019 11/5/2018    Basic metabolic panel Routine  11/5/2019 11/5/2018            Who to contact     If you have questions or need follow up information about today's clinic visit or your schedule please contact [facility] directly at [phone].  Normal or non-critical lab and imaging results will be communicated to you by MyChart, letter or phone within 4 business days after the clinic has received the results. If you do not hear from us within 7 days, please contact the clinic through MyChart or phone. If you have a critical or abnormal lab result, we will notify you by phone as soon as possible.  Submit refill requests through [employer] or call your pharmacy and they will forward the refill request to us. Please allow 3 business days for your refill to be completed.          Additional Information About Your Visit        Care EveryWhere ID     This is your Care EveryWhere ID. This could be used by other organizations to access your [facility] medical records  [id number]        Your Vitals Were     Pulse Temperature Respirations Height Pulse Oximetry BMI (Body Mass Index)    107 100.7  F (38.2  C) (Oral) 16 5' 10\" (1.778 m) 98% 32.05 kg/m2       Blood Pressure from Last 3 Encounters:   11/05/18 133/71   10/16/17 140/80   10/12/17 138/86    Weight from Last 3 Encounters:   11/05/18 223 lb 6.4 oz (101.3 kg)   10/16/17 253 lb (114.8 kg)   10/12/17 259 lb (117.5 kg)              We Performed the Following     *UA reflex to Microscopic and Culture ([place] and [facility] (except [place] and [place])     Urine Microscopic          Today's Medication Changes          These changes are accurate as of 11/5/18  2:23 PM.  If you have any questions, ask your nurse or doctor.               Start taking these medicines.     "    Dose/Directions    betamethasone dipropionate 0.05 % cream   Commonly known as:  DIPROSONE   Used for:  Pruritic erythematous rash   Started by:  Yaw Melendez MD        Apply sparingly to affected area twice daily as needed.  Do not apply to face.   Quantity:  45 g   Refills:  1       ciprofloxacin 500 MG tablet   Commonly known as:  CIPRO   Used for:  Urinary tract infection with hematuria, site unspecified   Started by:  Yaw Melendez MD        Dose:  500 mg   Take 1 tablet (500 mg) by mouth 2 times daily   Quantity:  28 tablet   Refills:  0            Where to get your medicines      These medications were sent to [facility] [place], [place] - [address] [facility]  [address] [address], [facility] [address]     Phone:  [phone]     betamethasone dipropionate 0.05 % cream    ciprofloxacin 500 MG tablet                Primary Care Provider Office Phone # Fax #    Rich Butler [phone] [address] [facility] [address]        Equal Access to Services     [facility]: Hadii nyasia ku hadasho Soomaali, waaxda luqadaha, qaybta kaalmada adeegdouglas, waxwiley rizzo . So [facility] [phone].    ATENCIÓN: Si habla español, tiene a banks disposición servicios gratuitos de asistencia lingüística. Llame al [phone].    We comply with applicable federal civil rights laws and [place] laws. We do not discriminate on the basis of race, color, national origin, age, disability, sex, sexual orientation, or gender identity.            Thank you!     Thank you for choosing [facility]  for your care. Our goal is always to provide you with excellent care. Hearing back from our patients is one way we can continue to improve our services. Please take a few minutes to complete the written survey that you may receive in the mail after your visit with us. Thank you!             Your Updated Medication List -  Protect others around you: Learn how to safely use, store and throw away your medicines at www.disposemymeds.org.          This list is accurate as of 11/5/18  2:23 PM.  Always use your most recent med list.                   Brand Name Dispense Instructions for use Diagnosis    aspirin 325 MG tablet      Take by mouth at onset of headache        betamethasone dipropionate 0.05 % cream    DIPROSONE    45 g    Apply sparingly to affected area twice daily as needed.  Do not apply to face.    Pruritic erythematous rash       ciprofloxacin 500 MG tablet    CIPRO    28 tablet    Take 1 tablet (500 mg) by mouth 2 times daily    Urinary tract infection with hematuria, site unspecified       lisinopril-hydrochlorothiazide 10-12.5 MG per tablet    PRINZIDE/ZESTORETIC     Take 1 tablet by mouth daily        losartan 100 MG tablet    COZAAR          Multi-vitamin Tabs tablet   Generic drug:  multivitamin, therapeutic with minerals      Take 1 tablet by mouth daily.    Hypertension goal BP (blood pressure) < 140/90       NAPROXEN PO      Take 250 mg by mouth        triamcinolone 55 MCG/ACT inhaler    NASACORT    1 Bottle    Spray 2 sprays into both nostrils 2 times daily    Acute sinusitis, recurrence not specified, unspecified location, Cough

## 2019-01-21 ENCOUNTER — OFFICE VISIT (OUTPATIENT)
Dept: FAMILY MEDICINE | Facility: CLINIC | Age: 74
End: 2019-01-21
Payer: COMMERCIAL

## 2019-01-21 VITALS
SYSTOLIC BLOOD PRESSURE: 148 MMHG | HEIGHT: 70 IN | HEART RATE: 80 BPM | BODY MASS INDEX: 32.5 KG/M2 | OXYGEN SATURATION: 98 % | TEMPERATURE: 98.1 F | WEIGHT: 227 LBS | DIASTOLIC BLOOD PRESSURE: 92 MMHG | RESPIRATION RATE: 13 BRPM

## 2019-01-21 DIAGNOSIS — Z00.00 ENCOUNTER FOR WELLNESS EXAMINATION: Primary | ICD-10-CM

## 2019-01-21 LAB
ANION GAP SERPL CALCULATED.3IONS-SCNC: 2 MMOL/L (ref 3–14)
BUN SERPL-MCNC: 17 MG/DL (ref 7–30)
CALCIUM SERPL-MCNC: 9.2 MG/DL (ref 8.5–10.1)
CHLORIDE SERPL-SCNC: 102 MMOL/L (ref 94–109)
CHOLEST SERPL-MCNC: 189 MG/DL
CO2 SERPL-SCNC: 33 MMOL/L (ref 20–32)
CREAT SERPL-MCNC: 0.92 MG/DL (ref 0.66–1.25)
CREAT UR-MCNC: 76 MG/DL
GFR SERPL CREATININE-BSD FRML MDRD: 83 ML/MIN/{1.73_M2}
GLUCOSE SERPL-MCNC: 148 MG/DL (ref 70–99)
HBA1C MFR BLD: 6.2 % (ref 0–5.6)
HDLC SERPL-MCNC: 28 MG/DL
LDLC SERPL CALC-MCNC: 113 MG/DL
MICROALBUMIN UR-MCNC: 6 MG/L
MICROALBUMIN/CREAT UR: 7.92 MG/G CR (ref 0–17)
NONHDLC SERPL-MCNC: 161 MG/DL
POTASSIUM SERPL-SCNC: 4.1 MMOL/L (ref 3.4–5.3)
SODIUM SERPL-SCNC: 137 MMOL/L (ref 133–144)
TRIGL SERPL-MCNC: 239 MG/DL
TSH SERPL DL<=0.005 MIU/L-ACNC: 3.12 MU/L (ref 0.4–4)

## 2019-01-21 PROCEDURE — 99397 PER PM REEVAL EST PAT 65+ YR: CPT | Performed by: FAMILY MEDICINE

## 2019-01-21 PROCEDURE — 36415 COLL VENOUS BLD VENIPUNCTURE: CPT | Performed by: FAMILY MEDICINE

## 2019-01-21 PROCEDURE — 80048 BASIC METABOLIC PNL TOTAL CA: CPT | Performed by: FAMILY MEDICINE

## 2019-01-21 PROCEDURE — 83036 HEMOGLOBIN GLYCOSYLATED A1C: CPT | Performed by: FAMILY MEDICINE

## 2019-01-21 PROCEDURE — 82043 UR ALBUMIN QUANTITATIVE: CPT | Performed by: FAMILY MEDICINE

## 2019-01-21 PROCEDURE — 80061 LIPID PANEL: CPT | Performed by: FAMILY MEDICINE

## 2019-01-21 PROCEDURE — 84443 ASSAY THYROID STIM HORMONE: CPT | Performed by: FAMILY MEDICINE

## 2019-01-21 ASSESSMENT — MIFFLIN-ST. JEOR: SCORE: 1785.92

## 2019-01-21 NOTE — PROGRESS NOTES
"SUBJECTIVE:   Neville Bonilla is a 73 year old male who presents for Preventive Visit.  Are you in the first 12 months of your Medicare Part B coverage?  No    Physical Health:    In general, how would you rate your overall physical health? excellent    Outside of work, how many days during the week do you exercise? 2-3 days/week    Outside of work, approximately how many minutes a day do you exercise?45-60 minutes    If you drink alcohol do you typically have >3 drinks per day or >7 drinks per week? No    Do you usually eat at least 4 servings of fruit and vegetables a day, include whole grains & fiber and avoid regularly eating high fat or \"junk\" foods? Yes    Do you have any problems taking medications regularly?  No    Do you have any side effects from medications? none    Needs assistance for the following daily activities: no assistance needed    Which of the following safety concerns are present in your home?  none identified     Hearing impairment: Yes, wears hearing aids    In the past 6 months, have you been bothered by leaking of urine? no    Mental Health:    In general, how would you rate your overall mental or emotional health? good  PHQ-2 Score: 0    Do you feel safe in your environment? Yes  Do you have a Health Care Directive? No: Advance care planning was reviewed with patient; patient declined at this time.    Additional concerns to address?  YES, HTN    Fall risk: accidental slipped on carpet     click delete button to remove this line now  Cognitive Screenin) Repeat 3 items (Leader, Season, Table)    2) Clock draw: NORMAL  3) 3 item recall: Recalls 2 objects   Results: NORMAL clock, 1-2 items recalled: COGNITIVE IMPAIRMENT LESS LIKELY    BP Readings from Last 6 Encounters:   19 (!) 148/92   18 133/71   10/16/17 140/80   10/12/17 138/86   16 133/79   16 140/80       Mini-CogTM Copyright S Just[employer]. Licensed by the author for use in [facility] [employer]; reprinted " with permission (navjot@[facility]). All rights reserved.      Do you have sleep apnea, excessive snoring or daytime drowsiness?: no    PROBLEMS TO ADD ON...  ADP: Wife Talked with wife.  Reviewed and updated as needed this visit by clinical staff  Tobacco  Allergies  Meds  Problems  Med Hx  Surg Hx  Fam Hx  Soc Hx        Reviewed and updated as needed this visit by Provider        Social History     Tobacco Use     Smoking status: Former Smoker     Years: 30.00     Last attempt to quit: 8/10/1993     Years since quittin.4     Smokeless tobacco: Never Used   Substance Use Topics     Alcohol use: No                       Current providers sharing in care for this patient include:   Patient Care Team:  Rich Butler MD as PCP - General (Family Practice)  Yaw Melendez MD as PCP - Assigned PCP    The following health maintenance items are reviewed in Epic and correct as of today:  Health Maintenance   Topic Date Due     FOOT EXAM Q1 YEAR  1946     EYE EXAM Q1 YEAR  1946     ADVANCE DIRECTIVE PLANNING Q5 YRS  2001     TSH W/ FREE T4 REFLEX Q2 YEAR  2009     AORTIC ANEURYSM SCREENING (SYSTEM ASSIGNED)  2011     A1C Q6 MO  2011     MICROALBUMIN Q1 YEAR  2012     ZOSTER IMMUNIZATION (2 of 3) 2014     INFLUENZA VACCINE (1) 2018     BMP Q1 YR  10/12/2018     LIPID MONITORING Q1 YEAR  10/12/2018     FALL RISK ASSESSMENT  2019     PHQ-2 Q1 YR  2019     COLON CANCER SCREEN (SYSTEM ASSIGNED)  2020     DTAP/TDAP/TD IMMUNIZATION (5 - Td) 10/03/2022     PNEUMOVAX IMMUNIZATION 65+ LOW/MEDIUM RISK  Completed     HEPATITIS C SCREENING  Completed     IPV IMMUNIZATION  Aged Out     MENINGITIS IMMUNIZATION  Aged Out     Patient Active Problem List   Diagnosis     CARDIOVASCULAR SCREENING; LDL GOAL LESS THAN 130     Hypertension goal BP (blood pressure) < 140/90     Obesity     HDL deficiency     Tear of medial meniscus of knee     S/P  "arthroscopy of right knee     Type 2 diabetes mellitus without complication (H)     Past Surgical History:   Procedure Laterality Date     HC KNEE SCOPE,MED/LAT MENISECTOMY  10/23/13    Right, with partial medial ONLY, chondoplasty      NASAL SURG PROC UNLISTED       ROTATOR CUFF REPAIR RT/LT  1999    Left     TONSILLECTOMY & ADENOIDECTOMY  Age 13     URETEROSTOMY  2001       Social History     Tobacco Use     Smoking status: Former Smoker     Years: 30.00     Last attempt to quit: 8/10/1993     Years since quittin.4     Smokeless tobacco: Never Used   Substance Use Topics     Alcohol use: No     Family History   Problem Relation Age of Onset     Heart Disease Mother      Diabetes Father      Asthma Brother      Thyroid Disease Sister          Pneumonia Vaccine:Adults age 65+ who received Pneumovax (PPSV23) at 65 years or older: Should be given PCV13 > 1 year after their most recent PPSV23    ROS:  Constitutional, HEENT, cardiovascular, pulmonary, gi and gu systems are negative, except as otherwise noted.    OBJECTIVE:   BP (!) 148/92 (BP Location: Left arm, Patient Position: Chair, Cuff Size: Adult Regular)   Pulse 80   Temp 98.1  F (36.7  C) (Oral)   Resp 13   Ht 1.778 m (5' 10\")   Wt 103 kg (227 lb)   SpO2 98%   BMI 32.57 kg/m   Estimated body mass index is 32.57 kg/m  as calculated from the following:    Height as of this encounter: 1.778 m (5' 10\").    Weight as of this encounter: 103 kg (227 lb).  EXAM:   GENERAL: healthy, alert and no distress  EYES: Eyes grossly normal to inspection, PERRL and conjunctivae and sclerae normal  HENT: ear canals and TM's normal, nose and mouth without ulcers or lesions  NECK: no adenopathy and thyroid normal to palpation  RESP: lungs clear to auscultation - no rales, rhonchi or wheezes  CV: regular rate and rhythm, normal S1 S2, no S3 or S4, no murmur, click or rub, no peripheral edema and peripheral pulses strong  ABDOMEN: soft, nontender, no masses " and bowel sounds normal  MS: no gross musculoskeletal defects noted, no edema  SKIN: no suspicious lesions or rashes  NEURO: Normal strength and tone, mentation intact and speech normal  PSYCH: mentation appears normal, affect normal/bright    Diagnostic Test Results:  Results for orders placed or performed in visit on 01/21/19   BASIC METABOLIC PANEL   Result Value Ref Range    Sodium 137 133 - 144 mmol/L    Potassium 4.1 3.4 - 5.3 mmol/L    Chloride 102 94 - 109 mmol/L    Carbon Dioxide 33 (H) 20 - 32 mmol/L    Anion Gap 2 (L) 3 - 14 mmol/L    Glucose 148 (H) 70 - 99 mg/dL    Urea Nitrogen 17 7 - 30 mg/dL    Creatinine 0.92 0.66 - 1.25 mg/dL    GFR Estimate 83 >60 mL/min/[1.73_m2]    GFR Estimate If Black >90 >60 mL/min/[1.73_m2]    Calcium 9.2 8.5 - 10.1 mg/dL   HEMOGLOBIN A1C   Result Value Ref Range    Hemoglobin A1C 6.2 (H) 0 - 5.6 %   Lipid panel reflex to direct LDL Fasting   Result Value Ref Range    Cholesterol 189 <200 mg/dL    Triglycerides 239 (H) <150 mg/dL    HDL Cholesterol 28 (L) >39 mg/dL    LDL Cholesterol Calculated 113 (H) <100 mg/dL    Non HDL Cholesterol 161 (H) <130 mg/dL   Albumin Random Urine Quantitative with Creat Ratio   Result Value Ref Range    Creatinine Urine 76 mg/dL    Albumin Urine mg/L 6 mg/L    Albumin Urine mg/g Cr 7.92 0 - 17 mg/g Cr   TSH WITH FREE T4 REFLEX   Result Value Ref Range    TSH 3.12 0.40 - 4.00 mU/L         ASSESSMENT / PLAN:   Neville was seen today for physical.    Diagnoses and all orders for this visit:    Encounter for wellness examination  -     BASIC METABOLIC PANEL  -     HEMOGLOBIN A1C  -     Lipid panel reflex to direct LDL Fasting  -     Albumin Random Urine Quantitative with Creat Ratio  -     TSH WITH FREE T4 REFLEX  BP slightly elevated, will follow up for recheck in 2 weeks if high will consider adjusting medications.  Shingrix: not interested at this time  Other orders  -     Cancel: FOOT EXAM  NO CHARGE [92534.114]  -     Hemoglobin A1c  End of Life  "Planning:    Patient currently has an advanced directive: No will discuss with wife/Family.    COUNSELING:  Reviewed preventive health counseling, as reflected in patient instructions       Regular exercise       Healthy diet/nutrition    BP Readings from Last 1 Encounters:   01/21/19 (!) 148/92     Estimated body mass index is 32.57 kg/m  as calculated from the following:    Height as of this encounter: 1.778 m (5' 10\").    Weight as of this encounter: 103 kg (227 lb).    Weight management plan: Discussed healthy diet and exercise guidelines     reports that he quit smoking about 25 years ago. He quit after 30.00 years of use. he has never used smokeless tobacco.    Appropriate preventive services were discussed with this patient, including applicable screening as appropriate for cardiovascular disease, diabetes, osteopenia/osteoporosis, and glaucoma.  As appropriate for age/gender, discussed screening for colorectal cancer, prostate cancer, breast cancer, and cervical cancer. Checklist reviewing preventive services available has been given to the patient.    Reviewed patients plan of care and provided an AVS. The Basic Care Plan (routine screening as documented in Health Maintenance) for Neville meets the Care Plan requirement. This Care Plan has been established and reviewed with the Patient.    Counseling Resources:  ATP IV Guidelines  Pooled Cohorts Equation Calculator  Breast Cancer Risk Calculator  FRAX Risk Assessment  ICSI Preventive Guidelines  Dietary Guidelines for Americans, 2010  USDA's MyPlate  ASA Prophylaxis  Lung CA Screening    Yaw Melendez MD  [facility]  "

## 2019-01-21 NOTE — LETTER
[address] [place] [address]    January 23, 2019    Neville Bonilla  [address] [place] [address]          Dear Owen Lozada is a copy of your results. You need to follow up to discuss starting a cholesterol lowering medication given that has diabetes.    Results for orders placed or performed in visit on 01/21/19   BASIC METABOLIC PANEL   Result Value Ref Range    Sodium 137 133 - 144 mmol/L    Potassium 4.1 3.4 - 5.3 mmol/L    Chloride 102 94 - 109 mmol/L    Carbon Dioxide 33 (H) 20 - 32 mmol/L    Anion Gap 2 (L) 3 - 14 mmol/L    Glucose 148 (H) 70 - 99 mg/dL    Urea Nitrogen 17 7 - 30 mg/dL    Creatinine 0.92 0.66 - 1.25 mg/dL    GFR Estimate 83 >60 mL/min/[1.73_m2]    GFR Estimate If Black >90 >60 mL/min/[1.73_m2]    Calcium 9.2 8.5 - 10.1 mg/dL   HEMOGLOBIN A1C   Result Value Ref Range    Hemoglobin A1C 6.2 (H) 0 - 5.6 %   Lipid panel reflex to direct LDL Fasting   Result Value Ref Range    Cholesterol 189 <200 mg/dL    Triglycerides 239 (H) <150 mg/dL    HDL Cholesterol 28 (L) >39 mg/dL    LDL Cholesterol Calculated 113 (H) <100 mg/dL    Non HDL Cholesterol 161 (H) <130 mg/dL   Albumin Random Urine Quantitative with Creat Ratio   Result Value Ref Range    Creatinine Urine 76 mg/dL    Albumin Urine mg/L 6 mg/L    Albumin Urine mg/g Cr 7.92 0 - 17 mg/g Cr   TSH WITH FREE T4 REFLEX   Result Value Ref Range    TSH 3.12 0.40 - 4.00 mU/L       If you have any questions or concerns, please me or my clinic team at [phone].      Sincerely,        Yaw Melendez MD/kanu

## 2019-01-21 NOTE — NURSING NOTE
"Chief Complaint   Patient presents with     Physical     Initial BP (!) 148/92 (BP Location: Left arm, Patient Position: Chair, Cuff Size: Adult Regular)   Pulse 80   Temp 98.1  F (36.7  C) (Oral)   Resp 13   Ht 1.778 m (5' 10\")   Wt 103 kg (227 lb)   SpO2 98%   BMI 32.57 kg/m   Estimated body mass index is 32.57 kg/m  as calculated from the following:    Height as of this encounter: 1.778 m (5' 10\").    Weight as of this encounter: 103 kg (227 lb).  BP completed using cuff size: regular    Kaleb Walsh  "

## 2019-01-21 NOTE — PATIENT INSTRUCTIONS
[facility]    If you have any questions regarding to your visit please contact your care team:       Team Purple:   Clinic Hours Telephone Number   Dr. Whitney Tripp   7am-7pm  Monday - Thursday   7am-5pm  Fridays  [phone]  (Appointment scheduling available 24/7)   Urgent Care - [place] and [facility] - 11am-9pm Monday-Friday Saturday-Sunday- 9am-5pm   [place] - 5pm-9pm Monday-Friday Saturday-Sunday- 9am-5pm  [phone] - [place]  [phone] - [place]       What options do I have for a visit other than an office visit? We offer electronic visits (e-visits) and telephone visits, when medically appropriate.  Please check with your medical insurance to see if these types of visits are covered, as you will be responsible for any charges that are not paid by your insurance.      You can use [employer] (secure electronic communication) to access to your chart, send your primary care provider a message, or make an appointment. Ask a team member how to get started.     For a price quote for your services, please call our Consumer Price Line at [phone] or our Imaging Cost estimation line at [phone] (for imaging tests).    Kaleb Walsh    Preventive Health Recommendations:     See your health care provider every year to    Review health changes.     Discuss preventive care.      Review your medicines if your doctor has prescribed any.      Talk with your health care provider about whether you should have a test to screen for prostate cancer (PSA).    Every 3 years, have a diabetes test (fasting glucose). If you are at risk for diabetes, you should have this test more often.    Every 5 years, have a cholesterol test. Have this test more often if you are at risk for high cholesterol or heart disease.     Every 10 years, have a colonoscopy. Or, have a yearly FIT test (stool test). These exams will check for colon  cancer.    Talk to with your health care provider about screening for Abdominal Aortic Aneurysm if you have a family history of AAA or have a history of smoking.    Shots:     Get a flu shot each year.     Get a tetanus shot every 10 years.     Talk to your doctor about your pneumonia vaccines. There are now two you should receive - Pneumovax (PPSV 23) and Prevnar (PCV 13).     Talk to your pharmacist about a shingles vaccine.     Talk to your doctor about the hepatitis B vaccine.  Nutrition:     Eat at least 5 servings of fruits and vegetables each day.     Eat whole-grain bread, whole-wheat pasta and brown rice instead of white grains and rice.     Get adequate Calcium and Vitamin D.   Lifestyle    Exercise for at least 150 minutes a week (30 minutes a day, 5 days a week). This will help you control your weight and prevent disease.     Limit alcohol to one drink per day.     No smoking.     Wear sunscreen to prevent skin cancer.    See your dentist every six months for an exam and cleaning.    See your eye doctor every 1 to 2 years to screen for conditions such as glaucoma, macular degeneration, cataracts, etc.    Personalized Prevention Plan  You are due for the preventive services outlined below.  Your care team is available to assist you in scheduling these services.  If you have already completed any of these items, please share that information with your care team to update in your medical record.  Health Maintenance Due   Topic Date Due     Diabetic Foot Exam - yearly  02/06/1946     Eye Exam - yearly  02/06/1946     Discuss Advance Directive Planning  02/06/2001     Thyroid Function Lab (TSH) - every 2 years  04/27/2009     AORTIC ANEURYSM SCREENING (SYSTEM ASSIGNED)  02/06/2011     A1C (Diabetes) Lab - every 6 months  02/17/2011     Microalbumin Lab - yearly  04/27/2012     Zoster (Chicken Pox) Vaccine (2 of 3) 12/12/2014     Flu Vaccine (1) 09/01/2018     Basic Metabolic Lab - yearly  10/12/2018      Cholesterol Lab - yearly  10/12/2018

## 2019-01-25 ENCOUNTER — TELEPHONE (OUTPATIENT)
Dept: FAMILY MEDICINE | Facility: CLINIC | Age: 74
End: 2019-01-25

## 2019-01-25 NOTE — TELEPHONE ENCOUNTER
Panel Management Review      Patient has the following on his problem list:   Diabetes    ASA: Passed    Last A1C  Lab Results   Component Value Date    A1C 6.2 01/21/2019    A1C 5.9 08/17/2010     A1C tested: FAILED    Last LDL:    Lab Results   Component Value Date    CHOL 189 01/21/2019     Lab Results   Component Value Date    HDL 28 01/21/2019     Lab Results   Component Value Date     01/21/2019     Lab Results   Component Value Date    TRIG 239 01/21/2019     Lab Results   Component Value Date    CHOLHDLRATIO 8.4 08/17/2010     Lab Results   Component Value Date    NHDL 161 01/21/2019       Is the patient on a Statin? NO             Is the patient on Aspirin? YES    Medications     Salicylates    aspirin 325 MG tablet          Last three blood pressure readings:  BP Readings from Last 3 Encounters:   01/21/19 (!) 148/92   11/05/18 133/71   10/16/17 140/80       Date of last diabetes office visit: 1/21/2019     Tobacco History:     History   Smoking Status     Former Smoker     Years: 30.00     Quit date: 8/10/1993   Smokeless Tobacco     Never Used         Hypertension   Last three blood pressure readings:  BP Readings from Last 3 Encounters:   01/21/19 (!) 148/92   11/05/18 133/71   10/16/17 140/80     Blood pressure: FAILED    HTN Guidelines:  Age 18-59 BP range:  Less than 140/90  Age 60-85 with Diabetes:  Less than 140/90  Age 60-85 without Diabetes:  less than 150/90      Composite cancer screening  Chart review shows that this patient is due/due soon for the following None  Summary:    Patient is due/failing the following:   None    Action needed:   None Patient was just seen on 1/21/2019    Type of outreach:    None, patient was just seen told to follow up in 6 months from 1/21/2019    Questions for provider review:    None                                                                                                                                    LS     Chart routed to None .

## 2019-03-07 LAB
ALT SERPL-CCNC: 26 U/L (ref 13–61)
AST SERPL-CCNC: 14 U/L (ref 15–37)
CHOLEST SERPL-MCNC: 174 MG/DL (ref 0–200)
HBA1C MFR BLD: 6 % (ref 4–6)
HDLC SERPL-MCNC: 27 MG/DL
LDLC SERPL CALC-MCNC: 112 MG/DL
NONHDLC SERPL-MCNC: 147 MG/DL
TRIGL SERPL-MCNC: 174 MG/DL (ref 0–150)

## 2019-04-22 LAB — RETINOPATHY: NEGATIVE

## 2019-05-06 ENCOUNTER — TELEPHONE (OUTPATIENT)
Dept: FAMILY MEDICINE | Facility: CLINIC | Age: 74
End: 2019-05-06

## 2019-05-16 ENCOUNTER — TELEPHONE (OUTPATIENT)
Dept: FAMILY MEDICINE | Facility: CLINIC | Age: 74
End: 2019-05-16

## 2019-05-16 NOTE — TELEPHONE ENCOUNTER
Panel Management Review      Patient has the following on his problem list:     Diabetes    ASA: Passed    Last A1C  Lab Results   Component Value Date    A1C 6.2 01/21/2019    A1C 5.9 08/17/2010     A1C tested: FAILED    Last LDL:    Lab Results   Component Value Date    CHOL 189 01/21/2019     Lab Results   Component Value Date    HDL 28 01/21/2019     Lab Results   Component Value Date     01/21/2019     Lab Results   Component Value Date    TRIG 239 01/21/2019     Lab Results   Component Value Date    CHOLHDLRATIO 8.4 08/17/2010     Lab Results   Component Value Date    NHDL 161 01/21/2019       Is the patient on a Statin? NO             Is the patient on Aspirin? YES    Medications     Salicylates     aspirin 325 MG tablet             Last three blood pressure readings:  BP Readings from Last 3 Encounters:   01/21/19 (!) 148/92   11/05/18 133/71   10/16/17 140/80       Date of last diabetes office visit: 1/21/2019     Tobacco History:     History   Smoking Status     Former Smoker     Years: 30.00     Quit date: 8/10/1993   Smokeless Tobacco     Never Used         Hypertension   Last three blood pressure readings:  BP Readings from Last 3 Encounters:   01/21/19 (!) 148/92   11/05/18 133/71   10/16/17 140/80     Blood pressure: FAILED    HTN Guidelines:  Less than 140/90      Composite cancer screening  Chart review shows that this patient is due/due soon for the following None  Summary:    Patient is due/failing the following:   BP CHECK    Action needed:   Ancillary BP check    Type of outreach:    none    Questions for provider review:    None                                                                                                                                    GELY Ayers       Chart routed to none   .

## 2019-06-21 LAB
ALT SERPL-CCNC: 21 U/L (ref 13–61)
AST SERPL-CCNC: 20 U/L (ref 15–37)
CHOLEST SERPL-MCNC: 136 MG/DL (ref 0–200)
CREAT SERPL-MCNC: 1.1 MG/DL (ref 0.7–1.2)
GLUCOSE SERPL-MCNC: 134 MG/DL (ref 74–106)
HDLC SERPL-MCNC: 18 MG/DL
LDLC SERPL CALC-MCNC: 53 MG/DL
NONHDLC SERPL-MCNC: 118 MG/DL
POTASSIUM SERPL-SCNC: 4.5 MMOL/L (ref 3.5–5)
TRIGL SERPL-MCNC: 327 MG/DL (ref 0–150)

## 2019-07-17 ENCOUNTER — TELEPHONE (OUTPATIENT)
Dept: FAMILY MEDICINE | Facility: CLINIC | Age: 74
End: 2019-07-17

## 2019-07-17 NOTE — TELEPHONE ENCOUNTER
Panel Management Review      Patient has the following on his problem list:     Hypertension   Last three blood pressure readings:  BP Readings from Last 3 Encounters:   01/21/19 (!) 148/92   11/05/18 133/71   10/16/17 140/80     Blood pressure: FAILED    HTN Guidelines:  Less than 140/90      Composite cancer screening  Chart review shows that this patient is due/due soon for the following None  Summary:    Patient is due/failing the following:   BP CHECK    Action needed:   Routed to provider for review.    Type of outreach:    Phone, spoke to patient.  [facility]    Questions for provider review:    None                                                                                                                                    Jada Lay [facility]         Chart routed to Provider .

## 2019-10-04 ENCOUNTER — TRANSFERRED RECORDS (OUTPATIENT)
Dept: HEALTH INFORMATION MANAGEMENT | Facility: CLINIC | Age: 74
End: 2019-10-04

## 2019-10-04 LAB — HBA1C MFR BLD: 6.2 % (ref 4–6)

## 2019-11-04 ENCOUNTER — TELEPHONE (OUTPATIENT)
Dept: FAMILY MEDICINE | Facility: CLINIC | Age: 74
End: 2019-11-04

## 2019-11-04 RX ORDER — ATORVASTATIN CALCIUM 20 MG/1
TABLET, FILM COATED ORAL
COMMUNITY
Start: 2019-05-20

## 2019-11-04 NOTE — TELEPHONE ENCOUNTER
Panel Management Review      Patient has the following on his problem list:     Diabetes    ASA: Passed    Last A1C  Lab Results   Component Value Date    A1C 6.2 01/21/2019    A1C 5.9 08/17/2010     A1C tested: Passed    Last LDL:    Lab Results   Component Value Date    CHOL 189 01/21/2019     Lab Results   Component Value Date    HDL 28 01/21/2019     Lab Results   Component Value Date     01/21/2019     Lab Results   Component Value Date    TRIG 239 01/21/2019     Lab Results   Component Value Date    CHOLHDLRATIO 8.4 08/17/2010     Lab Results   Component Value Date    NHDL 161 01/21/2019       Is the patient on a Statin? YES             Is the patient on Aspirin? YES    Medications     HMG CoA Reductase Inhibitors     atorvastatin (LIPITOR) 20 MG tablet       Salicylates     aspirin 325 MG tablet             Last three blood pressure readings:  BP Readings from Last 3 Encounters:   01/21/19 (!) 148/92   11/05/18 133/71   10/16/17 140/80       Date of last diabetes office visit: 1/21/19     Tobacco History:     History   Smoking Status     Former Smoker     Years: 30.00     Quit date: 8/10/1993   Smokeless Tobacco     Never Used           Composite cancer screening  Chart review shows that this patient is due/due soon for the following None  Summary:    Patient is due/failing the following:    BP CHECK    Action needed:   Patient needs office visit for diabetic and bp check.    Type of outreach:    Sent letter.    Questions for provider review:    None                                                                                                                                    Atiya Matthews MA       Chart routed to none .

## 2020-01-03 ENCOUNTER — TRANSFERRED RECORDS (OUTPATIENT)
Dept: HEALTH INFORMATION MANAGEMENT | Facility: CLINIC | Age: 75
End: 2020-01-03

## 2020-02-05 ENCOUNTER — OFFICE VISIT (OUTPATIENT)
Dept: FAMILY MEDICINE | Facility: CLINIC | Age: 75
End: 2020-02-05
Payer: COMMERCIAL

## 2020-02-05 ENCOUNTER — TELEPHONE (OUTPATIENT)
Dept: FAMILY MEDICINE | Facility: CLINIC | Age: 75
End: 2020-02-05

## 2020-02-05 VITALS
OXYGEN SATURATION: 100 % | RESPIRATION RATE: 8 BRPM | HEART RATE: 68 BPM | WEIGHT: 240 LBS | TEMPERATURE: 97.6 F | HEIGHT: 70 IN | BODY MASS INDEX: 34.36 KG/M2 | SYSTOLIC BLOOD PRESSURE: 138 MMHG | DIASTOLIC BLOOD PRESSURE: 84 MMHG

## 2020-02-05 DIAGNOSIS — E11.9 TYPE 2 DIABETES MELLITUS WITHOUT COMPLICATION, UNSPECIFIED WHETHER LONG TERM INSULIN USE (H): ICD-10-CM

## 2020-02-05 DIAGNOSIS — Z00.00 ROUTINE HISTORY AND PHYSICAL EXAMINATION OF ADULT: Primary | ICD-10-CM

## 2020-02-05 PROCEDURE — 99397 PER PM REEVAL EST PAT 65+ YR: CPT | Performed by: FAMILY MEDICINE

## 2020-02-05 ASSESSMENT — ENCOUNTER SYMPTOMS
JOINT SWELLING: 0
CHILLS: 0
NAUSEA: 0
SHORTNESS OF BREATH: 0
HEMATOCHEZIA: 0
FREQUENCY: 0
HEADACHES: 0
HEMATURIA: 0
COUGH: 1
PALPITATIONS: 0
HEARTBURN: 0
EYE PAIN: 0
ABDOMINAL PAIN: 0
DIARRHEA: 0
FEVER: 0
NERVOUS/ANXIOUS: 1
WEAKNESS: 0
ARTHRALGIAS: 0
DIZZINESS: 0
DYSURIA: 0
PARESTHESIAS: 0
CONSTIPATION: 0
MYALGIAS: 1
SORE THROAT: 0

## 2020-02-05 ASSESSMENT — MIFFLIN-ST. JEOR: SCORE: 1839.88

## 2020-02-05 ASSESSMENT — ACTIVITIES OF DAILY LIVING (ADL): CURRENT_FUNCTION: NO ASSISTANCE NEEDED

## 2020-02-05 NOTE — TELEPHONE ENCOUNTER
Called and left a message for Neville.    Need him to sign an VIKI. VIKI set up for him. Need to know if he would like this mailed to him or if he would like to sign this at the .    Please connect him with the care team.     Thank you    Nata Ellis,

## 2020-02-05 NOTE — LETTER
February 10, 2020        Neville Bonilla  [address] [place] [address]      Dear Neville,      When you were in the clinic for an appointment on 2/5/2020 with Yaw Melendez MD a release of information for the [facility] was not signed. Dr. Melendez has asked that you sign the release of information enclosed and send it back. This will help in our care for you. We have left two voicemails regarding this and have not heard back.     Please sign the enclosed release of information and return in the pre-paid envelope.     Thank you for helping us care for you.      Sincerely,        [facility] with Yaw Melendez's Care Team

## 2020-02-05 NOTE — PROGRESS NOTES
"SUBJECTIVE:   Neville Bonilla is a 74 year old male who presents for Preventive Visit.  Are you in the first 12 months of your Medicare coverage?  No    Healthy Habits:     In general, how would you rate your overall health?  Good    Frequency of exercise:  2-3 days/week    Duration of exercise:  45-60 minutes    Do you usually eat at least 4 servings of fruit and vegetables a day, include whole grains    & fiber and avoid regularly eating high fat or \"junk\" foods?  No    Taking medications regularly:  Yes    Medication side effects:  None    Ability to successfully perform activities of daily living:  No assistance needed    Home Safety:  No safety concerns identified    Hearing Impairment:  No hearing concerns    In the past 6 months, have you been bothered by leaking of urine?  No    In general, how would you rate your overall mental or emotional health?  Good      PHQ-2 Total Score: 0    Additional concerns today:  No    Do you feel safe in your environment? Yes    Have you ever done Advance Care Planning? (For example, a Health Directive, POLST, or a discussion with a medical provider or your loved ones about your wishes): Yes, patient states has an Advance Care Planning document and will bring a copy to the clinic.    Fall risk  Fallen 2 or more times in the past year?: No  Any fall with injury in the past year?: No  Hearing: Has hearing for past 4-5 yrs    Cognitive Screening   1) Repeat 3 items (Leader, Season, Table)    2) Clock draw: NORMAL  3) 3 item recall: Recalls NO objects   Results: 0 items recalled: PROBABLE COGNITIVE IMPAIRMENT, **INFORM PROVIDER    Has no concerns with memory says was just not paying attention    Mini-[employer]ogTM [employer]. Licensed by the author for use in [facility]; reprinted with permission ([email or web address]). All rights reserved.      Do you have sleep apnea, excessive snoring or daytime drowsiness?: no  [facility]    Other concerns:   " Diabetes/foot exam: Completed  [facility]    [facility]: [facility] in [facility]   [facility]: [facility]   [facility]   AAA:   Colonoscopy:      ROS: denies any of these symptoms   Congestion/ Hearing loss/cough   Urgency   Myalgia   Anxiety/Nervousness    Reviewed and updated as needed this visit by clinical staff  Tobacco  Allergies  Meds  Fam Hx       Reviewed and updated as needed this visit by Provider        Social History     Tobacco Use     Smoking status: Former Smoker     Years: 30.00     Last attempt to quit: 8/10/1993     Years since quittin.5     Smokeless tobacco: Never Used   Substance Use Topics     Alcohol use: No     If you drink alcohol do you typically have >3 drinks per day or >7 drinks per week? No    Alcohol Use 2020   Prescreen: >3 drinks/day or >7 drinks/week? No   Prescreen: >3 drinks/day or >7 drinks/week? -     Current providers sharing in care for this patient include:   Patient Care Team:  Rich Butler MD as PCP - General (Family Practice)  Yaw Melendez MD as Assigned PCP    The following health maintenance items are reviewed in Epic and correct as of today:  Health Maintenance   Topic Date Due     DIABETIC FOOT EXAM  1945     ADVANCE CARE PLANNING  1945     EYE EXAM  1945     AORTIC ANEURYSM SCREENING (SYSTEM ASSIGNED)  2011     ZOSTER IMMUNIZATION (2 of 3) 2014     A1C  2019     INFLUENZA VACCINE (1) 2019     FALL RISK ASSESSMENT  2019     PHQ-2  2020     BMP  2020     LIPID  2020     MICROALBUMIN  2020     MEDICARE ANNUAL WELLNESS VISIT  2020     COLONOSCOPY  2020     TSH W/FREE T4 REFLEX  2021     DTAP/TDAP/TD IMMUNIZATION (5 - Td) 10/03/2022     HEPATITIS C SCREENING  Completed     PNEUMOCOCCAL IMMUNIZATION 65+ LOW/MEDIUM RISK  Completed     IPV IMMUNIZATION  Aged Out     MENINGITIS IMMUNIZATION  Aged Out     Patient Active Problem List    Diagnosis     CARDIOVASCULAR SCREENING; LDL GOAL LESS THAN 130     Hypertension goal BP (blood pressure) < 140/90     Obesity     HDL deficiency     Tear of medial meniscus of knee     S/P arthroscopy of right knee     Type 2 diabetes mellitus without complication (H)     Past Surgical History:   Procedure Laterality Date     HC KNEE SCOPE,MED/LAT MENISECTOMY  10/23/13    Right, with partial medial ONLY, chondoplasty      NASAL SURG PROC UNLISTED       ROTATOR CUFF REPAIR RT/LT  1999    Left     TONSILLECTOMY & ADENOIDECTOMY  Age 13     URETEROSTOMY  2001       Social History     Tobacco Use     Smoking status: Former Smoker     Years: 30.00     Last attempt to quit: 8/10/1993     Years since quittin.5     Smokeless tobacco: Never Used   Substance Use Topics     Alcohol use: No     Family History   Problem Relation Age of Onset     Heart Disease Mother      Diabetes Father      Asthma Brother      Thyroid Disease Sister          Pneumonia Vaccine:Adults age 65+ who received Pneumovax (PPSV23) at 65 years or older: Should be given PCV13 > 1 year after their most recent PPSV23    Review of Systems   Constitutional: Negative for chills and fever.   HENT: Positive for congestion and hearing loss. Negative for ear pain and sore throat.    Eyes: Negative for pain and visual disturbance.   Respiratory: Positive for cough. Negative for shortness of breath.    Cardiovascular: Negative for chest pain, palpitations and peripheral edema.   Gastrointestinal: Negative for abdominal pain, constipation, diarrhea, heartburn, hematochezia and nausea.   Genitourinary: Positive for urgency. Negative for discharge, dysuria, frequency, genital sores, hematuria and impotence.   Musculoskeletal: Positive for myalgias. Negative for arthralgias and joint swelling.   Skin: Negative for rash.   Neurological: Negative for dizziness, weakness, headaches and paresthesias.   Psychiatric/Behavioral: Negative for mood changes.  "The patient is nervous/anxious.      OBJECTIVE:   /84   Pulse 68   Temp 97.6  F (36.4  C) (Oral)   Resp 8   Ht 1.778 m (5' 10\")   Wt 108.9 kg (240 lb)   SpO2 100%   BMI 34.44 kg/m   Estimated body mass index is 34.44 kg/m  as calculated from the following:    Height as of this encounter: 1.778 m (5' 10\").    Weight as of this encounter: 108.9 kg (240 lb).  Physical Exam  GENERAL: healthy, alert and no distress  EYES: Eyes grossly normal to inspection, PERRL and conjunctivae and sclerae normal  HENT: ear canals and TM's normal, nose and mouth without ulcers or lesions  NECK: no adenopathy and thyroid normal to palpation  RESP: lungs clear to auscultation - no rales, rhonchi or wheezes  CV: regular rate and rhythm, normal S1 S2, no S3 or S4, no murmur, click or rub, no peripheral edema   ABDOMEN: soft, nontender, no masses and bowel sounds normal  MS: no gross musculoskeletal defects noted, no edema  SKIN: no suspicious lesions or rashes  NEURO: Normal strength and tone, mentation intact and speech normal  PSYCH: mentation appears normal, affect normal/bright    Diagnostic Test Results:  Labs reviewed in Epic    ASSESSMENT / PLAN:   Neville was seen today for wellness visit.    Diagnoses and all orders for this visit:    Routine history and physical examination of adult    Type 2 diabetes mellitus without complication, unspecified whether long term insulin use (H)     -   [facility]    COUNSELING:  Reviewed preventive health counseling, as reflected in patient instructions       Regular exercise       Healthy diet/nutrition    Estimated body mass index is 34.44 kg/m  as calculated from the following:    Height as of this encounter: 1.778 m (5' 10\").    Weight as of this encounter: 108.9 kg (240 lb).    Weight management plan: Discussed healthy diet and exercise guidelines     reports that he quit smoking about 26 years ago. He quit after 30.00 years of use. He has " never used smokeless tobacco.      Appropriate preventive services were discussed with this patient, including applicable screening as appropriate for cardiovascular disease, diabetes, osteopenia/osteoporosis, and glaucoma.  As appropriate for age/gender, discussed screening for colorectal cancer, prostate cancer, breast cancer, and cervical cancer. Checklist reviewing preventive services available has been given to the patient.    Reviewed patients plan of care and provided an AVS. The Basic Care Plan (routine screening as documented in Health Maintenance) for Neville meets the Care Plan requirement. This Care Plan has been established and reviewed with the Patient.    Counseling Resources:  ATP IV Guidelines  Pooled Cohorts Equation Calculator  Breast Cancer Risk Calculator  FRAX Risk Assessment  ICSI Preventive Guidelines  Dietary Guidelines for Americans, 2010  [employer] MyPlate  ASA Prophylaxis  Lung CA Screening    Yaw Melendez MD  [facility]    Identified Health Risks:

## 2020-02-05 NOTE — TELEPHONE ENCOUNTER
Patient was in to see Dr. Melendez today and states he has labs that were completed by [facility], however, were unable to obtain these through [facility].     [facility] and [facility] to Dr. Cortez?    Anastasia Matthews RN

## 2020-06-02 ENCOUNTER — VIRTUAL VISIT (OUTPATIENT)
Dept: FAMILY MEDICINE | Facility: CLINIC | Age: 75
End: 2020-06-02
Payer: COMMERCIAL

## 2020-06-02 DIAGNOSIS — R09.81 NASAL CONGESTION: Primary | ICD-10-CM

## 2020-06-02 PROCEDURE — 99207 ZZC NON-BILLABLE SERV PER CHARTING: CPT | Performed by: FAMILY MEDICINE

## 2020-06-02 NOTE — PROGRESS NOTES
"Neville Bonilla is a 75 year old male who is being evaluated via a billable telephone visit.      The patient has been notified of following:     \"This telephone visit will be conducted via a call between you and your physician/provider. We have found that certain health care needs can be provided without the need for a physical exam.  This service lets us provide the care you need with a short phone conversation.  If a prescription is necessary we can send it directly to your pharmacy.  If lab work is needed we can place an order for that and you can then stop by our lab to have the test done at a later time.    Telephone visits are billed at different rates depending on your insurance coverage. During this emergency period, for some insurers they may be billed the same as an in-person visit.  Please reach out to your insurance provider with any questions.    If during the course of the call the physician/provider feels a telephone visit is not appropriate, you will not be charged for this service.\"    Patient has given verbal consent for Telephone visit?  Yes    What phone number would you like to be contacted at? [phone]    How would you like to obtain your AVS? Mail a copy    Subjective     Neville Bonilla is a 75 year old male who presents via phone visit today for the following health issues:    HPI  Acute Illness   Acute illness concerns: sinus  Onset: 2 week    Fever: no    Chills/Sweats: no    Headache (location?): no    Sinus Pressure:YES    Conjunctivitis:  no    Ear Pain: no    Rhinorrhea: YES right    Congestion: YES    Sore Throat: no     Cough: no    Wheeze: no    Decreased Appetite: no    Nausea: no    Vomiting: no    Diarrhea:  no    Dysuria/Freq.: no    Fatigue/Achiness: no    Sick/Strep Exposure: no     Therapies Tried and outcome: asa nascort    Nasal congestion and constantly running clear snort from the right nostril and can hardly breathe through this nostril   Denies any fever, headache, " dental pain, loss of smell or taste.  Has had sinus infections but on both nostrils.  Tried Nasocort but not helping    Assessment/Plan:  1. Nasal congestion: Unilateral Rt      Discussed with patient his symptoms; given the nasal plugging is on one side, will be unusual for sinus infection, could be a structural issue. Recommended OV for full examination.    Phone call duration: 7 minutes    Yaw Melendez MD

## 2020-06-03 ENCOUNTER — OFFICE VISIT (OUTPATIENT)
Dept: FAMILY MEDICINE | Facility: CLINIC | Age: 75
End: 2020-06-03
Payer: COMMERCIAL

## 2020-06-03 VITALS
RESPIRATION RATE: 18 BRPM | SYSTOLIC BLOOD PRESSURE: 128 MMHG | TEMPERATURE: 99.4 F | OXYGEN SATURATION: 98 % | WEIGHT: 243.8 LBS | BODY MASS INDEX: 34.98 KG/M2 | HEART RATE: 75 BPM | DIASTOLIC BLOOD PRESSURE: 74 MMHG

## 2020-06-03 DIAGNOSIS — J01.00 ACUTE NON-RECURRENT MAXILLARY SINUSITIS: Primary | ICD-10-CM

## 2020-06-03 PROCEDURE — 99213 OFFICE O/P EST LOW 20 MIN: CPT | Performed by: NURSE PRACTITIONER

## 2020-06-03 RX ORDER — AMOXICILLIN AND CLAVULANATE POTASSIUM 500; 125 MG/1; MG/1
1 TABLET, FILM COATED ORAL 3 TIMES DAILY
Qty: 21 TABLET | Refills: 0 | Status: SHIPPED | OUTPATIENT
Start: 2020-06-03 | End: 2020-06-10

## 2020-06-03 NOTE — PATIENT INSTRUCTIONS
Patient Education     Sinusitis (Antibiotic Treatment)    The sinuses are air-filled spaces within the bones of the face. They connect to the inside of the nose. Sinusitis is an inflammation of the tissue that lines the sinuses. Sinusitis can occur during a cold. It can also happen due to allergies to pollens and other particles in the air. Sinusitis can cause symptoms of sinus congestion and a feeling of fullness. A sinus infection causes fever, headache, and facial pain. There is often green or yellow fluid draining from the nose or into the back of the throat (post-nasal drip). You have been given antibiotics to treat this condition.  Home care    Take the full course of antibiotics as instructed. Do not stop taking them, even when you feel better.    Drink plenty of water, hot tea, and other liquids. This may help thin nasal mucus. It also may help your sinuses drain fluids.    Heat may help soothe painful areas of your face. Use a towel soaked in hot water. Or,  the shower and direct the warm spray onto your face. Using a vaporizer along with a menthol rub at night may also help soothe symptoms.     An expectorant with guaifenesin may help thin nasal mucus and help your sinuses drain fluids.    People with high blood pressure should not use decongestants. They can raise blood pressure.    You may use a saline nasal spray.     Over-the-counter antihistamines may help if allergies contributed to your sinusitis.      Do not use nasal rinses or irrigation during an acute sinus infection, unless your healthcare provider tells you to. Rinsing may spread the infection to other areas in your sinuses.    Use acetaminophen or ibuprofen to control pain, unless another pain medicine was prescribed to you. If you have chronic liver or kidney disease or ever had a stomach ulcer, talk with your healthcare provider before using these medicines. (Aspirin should never be taken by anyone under age 18 who is ill with a  fever. It may cause severe liver damage.)    Don't smoke. This can make symptoms worse.  Follow-up care  Follow up with your healthcare provider or our staff if you are not better in 1 week.  When to seek medical advice  Call your healthcare provider if any of these occur:    Facial pain or headache that gets worse    Stiff neck    Unusual drowsiness or confusion    Swelling of your forehead or eyelids    Vision problems, such as blurred or double vision    Fever of 100.4 F (38 C) or higher, or as directed by your healthcare provider    Seizure    Breathing problems    Symptoms don't go away in 10 days  Prevention  Here are steps you can take to help prevent an infection:    Keep good hand washing habits.    Don t have close contact with people who have sore throats, colds, or other upper respiratory infections.    Don t smoke, and stay away from secondhand smoke.    Stay up to date with of your vaccines.  Date Last Reviewed: 11/1/2017 2000-2019 [employer]. [address] [place] [id number]. All rights reserved. This information is not intended as a substitute for professional medical care. Always follow your healthcare professional's instructions.

## 2020-06-03 NOTE — PROGRESS NOTES
Subjective     Neville Bonilla is a 75 year old male who presents to clinic today for the following health issues:    HPI   RESPIRATORY SYMPTOMS      Duration: x 2 weeks    Description  nasal congestion, facial pain/pressure, cough and ear pain right    Severity: mild    Accompanying signs and symptoms: swelling on right face, feels like he can't breathe through the right nostril    History (predisposing factors):  none    Precipitating or alleviating factors: None    Therapies tried and outcome:  rest and fluids      Patient Active Problem List   Diagnosis     CARDIOVASCULAR SCREENING; LDL GOAL LESS THAN 130     Hypertension goal BP (blood pressure) < 140/90     Obesity     HDL deficiency     Tear of medial meniscus of knee     S/P arthroscopy of right knee     Type 2 diabetes mellitus without complication (H)     Past Surgical History:   Procedure Laterality Date      KNEE SCOPE,MED/LAT MENISECTOMY  10/23/13    Right, with partial medial ONLY, chondoplasty      NASAL SURG PROC UNLISTED       ROTATOR CUFF REPAIR RT/LT  1999    Left     TONSILLECTOMY & ADENOIDECTOMY  Age 13     URETEROSTOMY  2001       Social History     Tobacco Use     Smoking status: Former Smoker     Years: 30.00     Last attempt to quit: 8/10/1993     Years since quittin.8     Smokeless tobacco: Never Used   Substance Use Topics     Alcohol use: No     Family History   Problem Relation Age of Onset     Heart Disease Mother      Diabetes Father      Asthma Brother      Thyroid Disease Sister          Current Outpatient Medications   Medication Sig Dispense Refill     aspirin 325 MG tablet Take by mouth at onset of headache       atorvastatin (LIPITOR) 20 MG tablet        betamethasone dipropionate (DIPROSONE) 0.05 % cream Apply sparingly to affected area twice daily as needed.  Do not apply to face. 45 g 1     lisinopril-hydrochlorothiazide (PRINZIDE,ZESTORETIC) 10-12.5 MG per tablet Take 1 tablet by mouth daily       losartan  (COZAAR) 100 MG tablet        Multiple Vitamin (MULTI-VITAMIN) per tablet Take 1 tablet by mouth daily.       NAPROXEN PO Take 250 mg by mouth       triamcinolone (NASACORT) 55 MCG/ACT nasal inhaler Spray 2 sprays into both nostrils 2 times daily 1 Bottle 1     No Known Allergies  BP Readings from Last 3 Encounters:   06/03/20 128/74   02/05/20 138/84   01/21/19 (!) 148/92    Wt Readings from Last 3 Encounters:   06/03/20 110.6 kg (243 lb 12.8 oz)   02/05/20 108.9 kg (240 lb)   01/21/19 103 kg (227 lb)            Reviewed and updated as needed this visit by Provider  Tobacco  Allergies  Meds  Problems  Med Hx  Surg Hx  Fam Hx         Review of Systems   Constitutional, HEENT, cardiovascular, pulmonary, gi and gu systems are negative, except as otherwise noted.      Objective    /74   Pulse 75   Temp 99.4  F (37.4  C) (Oral)   Resp 18   Wt 110.6 kg (243 lb 12.8 oz)   SpO2 98%   BMI 34.98 kg/m    Body mass index is 34.98 kg/m .  Physical Exam   GENERAL: healthy, alert and no distress  EYES: Eyes grossly normal to inspection, PERRL and conjunctivae and sclerae normal  HENT: normal cephalic/atraumatic, ear canals and TM's normal, nasal mucosa edematous with NO blockages or significant deviated sputum appreciated, oropharynx clear and oral mucous membranes moist, sinuses tender to palpation  NECK: no adenopathy, no asymmetry, masses, or scars and thyroid normal to palpation  RESP: lungs clear to auscultation - no rales, rhonchi or wheezes  CV: regular rate and rhythm, normal S1 S2, no S3 or S4, no murmur, click or rub, no peripheral edema and peripheral pulses strong  SKIN: no suspicious lesions or rashes  NEURO: Normal strength and tone, mentation intact and speech normal  PSYCH: mentation appears normal, affect normal/bright    Diagnostic Test Results:  Labs reviewed in Epic  none         Assessment & Plan     1. Acute non-recurrent maxillary sinusitis  Counseled on self care measures such as saline  nasal spray or OTC allergy medication such as zyrtec or Claritin and tsering signs of when to return. May take the antibiotic with yogurt to help prevent diarrhea.   - amoxicillin-clavulanate (AUGMENTIN) 500-125 MG tablet; Take 1 tablet by mouth 3 times daily for 7 days  Dispense: 21 tablet; Refill: 0     Return if symptoms worsen or fail to improve.    [facility]

## 2021-07-26 ENCOUNTER — OFFICE VISIT (OUTPATIENT)
Dept: FAMILY MEDICINE | Facility: CLINIC | Age: 76
End: 2021-07-26
Payer: COMMERCIAL

## 2021-07-26 VITALS
DIASTOLIC BLOOD PRESSURE: 72 MMHG | SYSTOLIC BLOOD PRESSURE: 136 MMHG | HEART RATE: 69 BPM | HEIGHT: 70 IN | RESPIRATION RATE: 18 BRPM | BODY MASS INDEX: 32.07 KG/M2 | WEIGHT: 224 LBS | TEMPERATURE: 97.7 F

## 2021-07-26 DIAGNOSIS — Z87.891 FORMER SMOKER: ICD-10-CM

## 2021-07-26 DIAGNOSIS — I10 HTN, GOAL BELOW 140/90: ICD-10-CM

## 2021-07-26 DIAGNOSIS — Z00.00 ENCOUNTER FOR MEDICARE ANNUAL WELLNESS EXAM: ICD-10-CM

## 2021-07-26 DIAGNOSIS — E11.9 TYPE 2 DIABETES MELLITUS WITHOUT COMPLICATION, WITHOUT LONG-TERM CURRENT USE OF INSULIN (H): ICD-10-CM

## 2021-07-26 DIAGNOSIS — Z00.00 ENCOUNTER FOR ANNUAL WELLNESS EXAM IN MEDICARE PATIENT: Primary | ICD-10-CM

## 2021-07-26 LAB
CHOLEST SERPL-MCNC: 144 MG/DL
CREAT UR-MCNC: 128 MG/DL
FASTING STATUS PATIENT QL REPORTED: YES
HDLC SERPL-MCNC: 26 MG/DL
LDLC SERPL CALC-MCNC: 57 MG/DL
LDLC SERPL CALC-MCNC: ABNORMAL MG/DL
MICROALBUMIN UR-MCNC: 7 MG/DL
MICROALBUMIN/CREAT UR: 5.47 MG/G CR (ref 0–17)
NONHDLC SERPL-MCNC: 118 MG/DL
TRIGL SERPL-MCNC: 406 MG/DL

## 2021-07-26 PROCEDURE — 82043 UR ALBUMIN QUANTITATIVE: CPT | Performed by: FAMILY MEDICINE

## 2021-07-26 PROCEDURE — 99397 PER PM REEVAL EST PAT 65+ YR: CPT | Performed by: FAMILY MEDICINE

## 2021-07-26 PROCEDURE — 99207 PR FOOT EXAM NO CHARGE: CPT | Mod: 25 | Performed by: FAMILY MEDICINE

## 2021-07-26 PROCEDURE — 80061 LIPID PANEL: CPT | Performed by: FAMILY MEDICINE

## 2021-07-26 PROCEDURE — 36415 COLL VENOUS BLD VENIPUNCTURE: CPT | Performed by: FAMILY MEDICINE

## 2021-07-26 PROCEDURE — 83721 ASSAY OF BLOOD LIPOPROTEIN: CPT | Mod: 59 | Performed by: FAMILY MEDICINE

## 2021-07-26 ASSESSMENT — ACTIVITIES OF DAILY LIVING (ADL): CURRENT_FUNCTION: NO ASSISTANCE NEEDED

## 2021-07-26 ASSESSMENT — MIFFLIN-ST. JEOR: SCORE: 1760.49

## 2021-07-26 NOTE — PATIENT INSTRUCTIONS
Patient Education   Personalized Prevention Plan  You are due for the preventive services outlined below.  Your care team is available to assist you in scheduling these services.  If you have already completed any of these items, please share that information with your care team to update in your medical record.  Health Maintenance Due   Topic Date Due     AORTIC ANEURYSM SCREENING (SYSTEM ASSIGNED)  Never done     Basic Metabolic Panel  01/21/2020     Kidney Microalbumin Urine Test  01/21/2020     Zoster (Shingles) Vaccine (3 of 3) 02/28/2020     A1C Lab  04/04/2020     Eye Exam  04/22/2020     Cholesterol Lab  06/21/2020     Colorectal Cancer Screening  09/27/2020     FALL RISK ASSESSMENT  02/05/2021     COVID-19 Vaccine (2 - Moderna 2-dose series) 04/07/2021       Understanding USDA MyPlate  The USDA has guidelines to help you make healthy food choices. These are called MyPlate. MyPlate shows the food groups that make up healthy meals using the image of a place setting. Before you eat, think about the healthiest choices for what to put on your plate or in your cup or bowl. To learn more about building a healthy plate, visit www.choosemyplate.gov.    The food groups    Fruits. Any fruit or 100% fruit juice counts as part of the Fruit Group. Fruits may be fresh, canned, frozen, or dried, and may be whole, cut-up, or pureed. Make 1/2 of your plate fruits and vegetables.    Vegetables. Any vegetable or 100% vegetable juice counts as a member of the Vegetable Group. Vegetables may be fresh, frozen, canned, or dried. They can be served raw or cooked and may be whole, cut-up, or mashed. Make 1/2 of your plate fruits and vegetables.    Grains. All foods made from grains are part of the Grains Group. These include wheat, rice, oats, cornmeal, and barley. Grains are often used to make foods such as bread, pasta, oatmeal, cereal, tortillas, and grits. Grains should be no more than 1/4 of your plate. At least half of your  grains should be whole grains.    Protein. This group includes meat, poultry, seafood, beans and peas, eggs, processed soy products (such as tofu), nuts (including nut butters), and seeds. Make protein choices no more than 1/4 of your plate. Meat and poultry choices should be lean or low fat.    Dairy. The Dairy Group includes all fluid milk products and foods made from milk that contain calcium, such as yogurt and cheese. (Foods that have little calcium, such as cream, butter, and cream cheese, are not part of this group.) Most dairy choices should be low-fat or fat-free.    Oils. Oils aren't a food group, but they do contain essential nutrients. However it's important to watch your intake of oils. These are fats that are liquid at room temperature. They include canola, corn, olive, soybean, vegetable, and sunflower oil. Foods that are mainly oil include mayonnaise, certain salad dressings, and soft margarines. You likely already get your daily oil allowance from the foods you eat.  Things to limit  Eating healthy also means limiting these things in your diet:       Salt (sodium). Many processed foods have a lot of sodium. To keep sodium intake down, eat fresh vegetables, meats, poultry, and seafood when possible. Purchase low-sodium, reduced-sodium, or no-salt-added food products at the store. And don't add salt to your meals at home. Instead, season them with herbs and spices such as dill, oregano, cumin, and paprika. Or try adding flavor with lemon or lime zest and juice.    Saturated fat. Saturated fats are most often found in animal products such as beef, pork, and chicken. They are often solid at room temperature, such as butter. To reduce your saturated fat intake, choose leaner cuts of meat and poultry. And try healthier cooking methods such as grilling, broiling, roasting, or baking. For a simple lower-fat swap, use plain nonfat yogurt instead of mayonnaise when making potato salad or macaroni  salad.    Added sugars. These are sugars added to foods. They are in foods such as ice cream, candy, soda, fruit drinks, sports drinks, energy drinks, cookies, pastries, jams, and syrups. Cut down on added sugars by sharing sweet treats with a family member or friend. You can also choose fruit for dessert, and drink water or other unsweetened beverages.     [employer] last reviewed this educational content on 6/1/2020 2000-2021 [employer]. All rights reserved. This information is not intended as a substitute for professional medical care. Always follow your healthcare professional's instructions.

## 2021-07-26 NOTE — PROGRESS NOTES
"SUBJECTIVE:   Neville Bonilla is a 76 year old male who presents for Preventive Visit.    Patient has been advised of split billing requirements and indicates understanding: Yes   Are you in the first 12 months of your Medicare coverage?  No    Healthy Habits:    In general, how would you rate your overall health?  Good    Frequency of exercise:  4-5 days/week    Duration of exercise:  30-45 minutes    Do you usually eat at least 4 servings of fruit and vegetables a day, include whole grains    & fiber and avoid regularly eating high fat or \"junk\" foods?  No    Taking medications regularly:  Yes    Barriers to taking medications:  None    Medication side effects:  None    Ability to successfully perform activities of daily living:  No assistance needed    Home Safety:  No safety concerns identified    Hearing impairment symptoms: hearing aid.    In the past 6 months, have you been bothered by leaking of urine?  No    In general, how would you rate your overall mental or emotional health?  Very good      PHQ-2 Total Score:    Additional concerns today:  No    Do you feel safe in your environment? Yes    Have you ever done Advance Care Planning? (For example, a Health Directive, POLST, or a discussion with a medical provider or your loved ones about your wishes): Yes, patient states has an Advance Care Planning document and will bring a copy to the clinic.    Living arrangement: Lives with wife  Ambulation; good  Vision:uses glasses  Hearing: Has hearing and is good  Bladder control: good  Memory: Good  ADLs: independent  Driving: Yes    Fall risk  Fallen 2 or more times in the past year?: No  Any fall with injury in the past year?: No    Cognitive Screening   1) Repeat 3 items (Leader, Season, Table)    2) Clock draw: NORMAL  3) 3 item recall: Recalls 2 objects   Results: NORMAL clock, 1-2 items recalled: COGNITIVE IMPAIRMENT LESS LIKELY    Mini-CogTM Copyright S [employer]. Licensed by the author for use in [facility]; reprinted with permission (soob@.[facility]). All rights reserved.      Do you have sleep apnea, excessive snoring or daytime drowsiness?: no    Reviewed and updated as needed this visit by clinical staff  Tobacco   Meds              Reviewed and updated as needed this visit by Provider                Social History     Tobacco Use     Smoking status: Former Smoker     Years: 30.00     Quit date: 8/10/1993     Years since quittin.9     Smokeless tobacco: Never Used   Substance Use Topics     Alcohol use: No     If you drink alcohol do you typically have >3 drinks per day or >7 drinks per week? No    No flowsheet data found.    Diabetes Follow-up    [facility] A1c 7.3     Hyperlipidemia Follow-Up      Are you regularly taking any medication or supplement to lower your cholesterol?   Yes- Lipitor    Are you having muscle aches or other side effects that you think could be caused by your cholesterol lowering medication?  No    Hypertension Follow-up   Taking medications regularly     Current providers sharing in care for this patient include:   Patient Care Team:  Rich Butler MD as PCP - General (Family Practice)  Yaw Melendez MD as Assigned PCP    The following health maintenance items are reviewed in Epic and correct as of today:  Health Maintenance Due   Topic Date Due     DIABETIC FOOT EXAM  Never done     ANNUAL REVIEW OF HM ORDERS  Never done     AORTIC ANEURYSM SCREENING (SYSTEM ASSIGNED)  Never done     BMP  2020     MICROALBUMIN  2020     ZOSTER IMMUNIZATION (3 of 3) 2020     A1C  2020     EYE EXAM  2020     LIPID  2020     COLORECTAL CANCER SCREENING  2020     FALL RISK ASSESSMENT  2021     COVID-19 Vaccine (2 - Moderna 2-dose series) 2021     Patient Active Problem List   Diagnosis     CARDIOVASCULAR SCREENING; LDL GOAL LESS THAN 130     Hypertension goal BP (blood pressure) < 140/90     Obesity      "HDL deficiency     Tear of medial meniscus of knee     S/P arthroscopy of right knee     Type 2 diabetes mellitus without complication (H)     Past Surgical History:   Procedure Laterality Date     HC KNEE SCOPE,MED/LAT MENISECTOMY  10/23/13    Right, with partial medial ONLY, chondoplasty     HC NASAL SURG PROC UNLISTED       ROTATOR CUFF REPAIR RT/LT  1999    Left     TONSILLECTOMY & ADENOIDECTOMY  Age 13     URETEROSTOMY  2001       Social History     Tobacco Use     Smoking status: Former Smoker     Years: 30.00     Quit date: 8/10/1993     Years since quittin.9     Smokeless tobacco: Never Used   Substance Use Topics     Alcohol use: No     Family History   Problem Relation Age of Onset     Heart Disease Mother      Diabetes Father      Asthma Brother      Thyroid Disease Sister          Pneumonia Vaccine:Adults age 65+ who received Pneumovax (PPSV23) at 65 years or older: Should be given PCV13 > 1 year after their most recent PPSV23        Review of Systems  Constitutional, HEENT, cardiovascular, pulmonary, gi and gu systems are negative, except as otherwise noted.    OBJECTIVE:   BP (!) 160/85   Pulse 69   Temp 97.7  F (36.5  C) (Oral)   Resp (!) 98   Ht 1.783 m (5' 10.2\")   Wt 101.6 kg (224 lb)   HC 18 cm (7.09\")   BMI 31.96 kg/m   Estimated body mass index is 31.96 kg/m  as calculated from the following:    Height as of this encounter: 1.783 m (5' 10.2\").    Weight as of this encounter: 101.6 kg (224 lb).  Physical Exam  GENERAL: healthy, alert and no distress  EYES: Eyes grossly normal to inspection, PERRL and conjunctivae and sclerae normal  HENT: ear canals and TM's normal, nose and mouth without ulcers or lesions  NECK: no adenopathy and thyroid normal to palpation  RESP: lungs clear to auscultation - no rales, rhonchi or wheezes  CV: regular rate and rhythm, normal S1 S2, no S3 or S4, no murmur, click or rub, no peripheral edema  ABDOMEN: soft, nontender, no masses and bowel " "sounds normal  MS: no gross musculoskeletal defects noted, no edema  SKIN: no suspicious lesions or rashes  NEURO: Normal strength and tone, mentation intact and speech normal  PSYCH: mentation appears normal, affect normal/bright  Diabetic foot exam: normal DP and PT pulses, no trophic changes and normal sensory exam    Diagnostic Test Results:  Labs reviewed in Epic    ASSESSMENT / PLAN:   Neville was seen today for physical.    Diagnoses and all orders for this visit:    Encounter for annual wellness exam in Medicare patient    Type 2 diabetes mellitus without complication, without long-term current use of insulin (H)  -     Albumin Random Urine Quantitative with Creat Ratio; Future  -     Lipid panel reflex to direct LDL Fasting; Future  -     FOOT EXAM  -     Lipid panel reflex to direct LDL Fasting  -     Albumin Random Urine Quantitative with Creat Ratio    HTN, goal below 140/90       -     Well controlled.    Former smoker  -     US Abdominal Aorta Imaging; Future    Other orders  -     REVIEW OF HEALTH MAINTENANCE PROTOCOL ORDERS    Patient has been advised of split billing requirements and indicates understanding: Yes  COUNSELING:  Reviewed preventive health counseling, as reflected in patient instructions       Regular exercise       Healthy diet/nutrition       Bladder control    BMI 31.0-31.9,adult  Estimated body mass index is 31.96 kg/m  as calculated from the following:    Height as of this encounter: 1.783 m (5' 10.2\").    Weight as of this encounter: 101.6 kg (224 lb).    Weight management plan: Discussed healthy diet and exercise guidelines    He reports that he quit smoking about 27 years ago. He quit after 30.00 years of use. He has never used smokeless tobacco.      Appropriate preventive services were discussed with this patient, including applicable screening as appropriate for cardiovascular disease, diabetes, osteopenia/osteoporosis, and glaucoma.  As appropriate for age/gender, discussed " screening for colorectal cancer, prostate cancer, breast cancer, and cervical cancer. Checklist reviewing preventive services available has been given to the patient.    Reviewed patients plan of care and provided an AVS. The Basic Care Plan (routine screening as documented in Health Maintenance) for Neville meets the Care Plan requirement. This Care Plan has been established and reviewed with the Patient.    Counseling Resources:  ATP IV Guidelines  Pooled Cohorts Equation Calculator  Breast Cancer Risk Calculator  Breast Cancer: Medication to Reduce Risk  FRAX Risk Assessment  ICSI Preventive Guidelines  Dietary Guidelines for Americans, 2010  USDA's MyPlate  ASA Prophylaxis  Lung CA Screening    Yaw Melendez MD  [facility]    Identified Health Risks:    The patient was counseled and encouraged to consider modifying their diet and eating habits. He was provided with information on recommended healthy diet options.

## 2021-08-02 ENCOUNTER — ANCILLARY PROCEDURE (OUTPATIENT)
Dept: ULTRASOUND IMAGING | Facility: CLINIC | Age: 76
End: 2021-08-02
Attending: FAMILY MEDICINE
Payer: COMMERCIAL

## 2021-08-02 DIAGNOSIS — Z87.891 FORMER SMOKER: ICD-10-CM

## 2021-08-02 PROCEDURE — 76775 US EXAM ABDO BACK WALL LIM: CPT | Performed by: RADIOLOGY

## 2022-07-27 ENCOUNTER — OFFICE VISIT (OUTPATIENT)
Dept: FAMILY MEDICINE | Facility: CLINIC | Age: 77
End: 2022-07-27
Payer: COMMERCIAL

## 2022-07-27 VITALS
SYSTOLIC BLOOD PRESSURE: 128 MMHG | WEIGHT: 244.8 LBS | RESPIRATION RATE: 17 BRPM | DIASTOLIC BLOOD PRESSURE: 84 MMHG | TEMPERATURE: 97.6 F | HEIGHT: 71 IN | HEART RATE: 80 BPM | BODY MASS INDEX: 34.27 KG/M2 | OXYGEN SATURATION: 97 %

## 2022-07-27 DIAGNOSIS — Z00.00 ENCOUNTER FOR WELLNESS EXAMINATION: Primary | ICD-10-CM

## 2022-07-27 DIAGNOSIS — E11.9 TYPE 2 DIABETES MELLITUS WITHOUT COMPLICATION, WITHOUT LONG-TERM CURRENT USE OF INSULIN (H): ICD-10-CM

## 2022-07-27 DIAGNOSIS — Z00.00 ENCOUNTER FOR MEDICARE ANNUAL WELLNESS EXAM: ICD-10-CM

## 2022-07-27 DIAGNOSIS — Z13.220 SCREENING FOR HYPERLIPIDEMIA: ICD-10-CM

## 2022-07-27 PROCEDURE — 99207 PR FOOT EXAM NO CHARGE: CPT | Mod: 25 | Performed by: FAMILY MEDICINE

## 2022-07-27 PROCEDURE — G0439 PPPS, SUBSEQ VISIT: HCPCS | Performed by: FAMILY MEDICINE

## 2022-07-27 RX ORDER — METFORMIN HCL 500 MG
1000 TABLET, EXTENDED RELEASE 24 HR ORAL
COMMUNITY
Start: 2022-04-22

## 2022-07-27 ASSESSMENT — PAIN SCALES - GENERAL: PAINLEVEL: MILD PAIN (2)

## 2022-07-27 ASSESSMENT — ACTIVITIES OF DAILY LIVING (ADL): CURRENT_FUNCTION: NO ASSISTANCE NEEDED

## 2022-07-27 NOTE — PROGRESS NOTES
"SUBJECTIVE:   Neville Bonilla is a 77 year old male who presents for Preventive Visit.  Are you in the first 12 months of your Medicare coverage?  No    Healthy Habits:    In general, how would you rate your overall health?  Good    Frequency of exercise:  2-3 days/week    Duration of exercise:  15-30 minutes    Do you usually eat at least 4 servings of fruit and vegetables a day, include whole grains    & fiber and avoid regularly eating high fat or \"junk\" foods?  No    Taking medications regularly:  Yes    Barriers to taking medications:  None    Medication side effects:  Significant flushing    Ability to successfully perform activities of daily living:  No assistance needed    Home Safety:  No safety concerns identified    Hearing Impairment:  No hearing concerns    In the past 6 months, have you been bothered by leaking of urine?  No    In general, how would you rate your overall mental or emotional health?  Good      PHQ-2 Total Score:    Additional concerns today:  Yes    Do you feel safe in your environment? Yes    Have you ever done Advance Care Planning? (For example, a Health Directive, POLST, or a discussion with a medical provider or your loved ones about your wishes): No, advance care planning information given to patient to review.  Patient declined advance care planning discussion at this time.     Fall risk  Fallen 2 or more times in the past year?: No  Any fall with injury in the past year?: No    Cognitive Screening   1) Repeat 3 items (Leader, Season, Table)    2) Clock draw: NORMAL  3) 3 item recall: Recalls 2 objects   Results: NORMAL clock, 1-2 items recalled: COGNITIVE IMPAIRMENT LESS LIKELY    Mini-CogTM Copyright JULIOCESAR Horn. Licensed by the author for use in [facility]; reprinted with permission ([email or web address]). All rights reserved.      Functioning:  Living arrangement: Living with wife in their house and have a pupy dog  Ambulation; good  Vision: fair  Hearing: hearing " aids  Bladder control: Meftformin cleaning him up  Memory: fair  ADLs: inepdenendent  Driving: Still drives  Started taking some zinc      Do you have sleep apnea, excessive snoring or daytime drowsiness?: yes    Reviewed and updated as needed this visit by clinical staff   Tobacco  Allergies  Meds   Med Hx  Surg Hx  Fam Hx  Soc Hx          Reviewed and updated as needed this visit by Provider                   Social History     Tobacco Use     Smoking status: Former Smoker     Years: 30.     Quit date: 8/10/1993     Years since quittin.9     Smokeless tobacco: Never Used   Substance Use Topics     Alcohol use: No     If you drink alcohol do you typically have >3 drinks per day or >7 drinks per week? No    Current providers sharing in care for this patient include:   Patient Care Team:  Rich Butler MD as PCP - General (Family Practice)  Yaw Melendez MD as Assigned PCP    The following health maintenance items are reviewed in Epic and correct as of today:  Health Maintenance Due   Topic Date Due     BMP  2020     A1C  2020     EYE EXAM  2020     LIPID  2022     MICROALBUMIN  2022     DIABETIC FOOT EXAM  2022     ANNUAL REVIEW OF HM ORDERS  2022     Patient Active Problem List   Diagnosis     CARDIOVASCULAR SCREENING; LDL GOAL LESS THAN 130     Hypertension goal BP (blood pressure) < 140/90     Obesity     HDL deficiency     Tear of medial meniscus of knee     S/P arthroscopy of right knee     Type 2 diabetes mellitus without complication (H)     Past Surgical History:   Procedure Laterality Date      KNEE SCOPE,MED/LAT MENISECTOMY  10/23/13    Right, with partial medial ONLY, chondoplasty      NASAL SURG PROC UNLISTED       ROTATOR CUFF REPAIR RT/LT  1999    Left     TONSILLECTOMY & ADENOIDECTOMY  Age 13     URETEROSTOMY  2001       Social History     Tobacco Use     Smoking status: Former Smoker     Years: 30.00     Quit date:  "8/10/1993     Years since quittin.9     Smokeless tobacco: Never Used   Substance Use Topics     Alcohol use: No     Family History   Problem Relation Age of Onset     Heart Disease Mother      Diabetes Father      Asthma Brother      Thyroid Disease Sister          Review of Systems  Constitutional, HEENT, cardiovascular, pulmonary, gi and gu systems are negative, except as otherwise noted.    OBJECTIVE:   /84   Pulse 80   Temp 97.6  F (36.4  C) (Oral)   Resp 17   Ht 1.8 m (5' 10.87\")   Wt 111 kg (244 lb 12.8 oz)   SpO2 97%   BMI 34.27 kg/m   Estimated body mass index is 34.27 kg/m  as calculated from the following:    Height as of this encounter: 1.8 m (5' 10.87\").    Weight as of this encounter: 111 kg (244 lb 12.8 oz).  Physical Exam  GENERAL: healthy, alert and no distress  EYES: Eyes grossly normal to inspection, PERRL and conjunctivae and sclerae normal  HENT: ear canals and TM's normal, nose and mouth without ulcers or lesions  NECK: no adenopathy and thyroid normal to palpation  RESP: lungs clear to auscultation - no rales, rhonchi or wheezes  CV: regular rate and rhythm, no murmur, click or rub, no peripheral edema  ABDOMEN: soft, nontender, no masses and bowel sounds normal  MS: no gross musculoskeletal defects noted, no edema  SKIN: no suspicious lesions or rashes  NEURO: Normal strength and tone, mentation intact and speech normal  PSYCH: mentation appears normal, affect normal/bright  Diabetic foot exam: normal DP and PT pulses, no trophic changes or ulcerative lesions and normal sensory exam. Hypertrophic discolored great toe nails    Diagnostic Test Results:  Labs reviewed in Epic    ASSESSMENT / PLAN:   Neville was seen today for physical.    Diagnoses and all orders for this visit:    Encounter for wellness examination    - [facility]    Type 2 diabetes mellitus without complication, without long-term current use of insulin (H)    Follows with " "eye specialist and had exam; will bring records    Does lab work and [facility]  -     FOOT EXAM    BMI 34.0-34.9,adult    Screening for hyperlipidemia    Patient has been advised of split billing requirements and indicates understanding: Yes    COUNSELING:  Reviewed preventive health counseling, as reflected in patient instructions       Regular exercise       Healthy diet/nutrition       Bladder control       Fall risk prevention    Estimated body mass index is 34.27 kg/m  as calculated from the following:    Height as of this encounter: 1.8 m (5' 10.87\").    Weight as of this encounter: 111 kg (244 lb 12.8 oz).    Weight management plan: Discussed healthy diet and exercise guidelines    He reports that he quit smoking about 28 years ago. He quit after 30.00 years of use. He has never used smokeless tobacco.      Appropriate preventive services were discussed with this patient, including applicable screening as appropriate for cardiovascular disease, diabetes, osteopenia/osteoporosis, and glaucoma.  As appropriate for age/gender, discussed screening for colorectal cancer, prostate cancer, breast cancer, and cervical cancer. Checklist reviewing preventive services available has been given to the patient.    Reviewed patients plan of care and provided an AVS. The Basic Care Plan (routine screening as documented in Health Maintenance) for Neville meets the Care Plan requirement. This Care Plan has been established and reviewed with the Patient.    Counseling Resources:  ATP IV Guidelines  Pooled Cohorts Equation Calculator  Breast Cancer Risk Calculator  Breast Cancer: Medication to Reduce Risk  FRAX Risk Assessment  ICSI Preventive Guidelines  Dietary Guidelines for Americans, 2010  USDA's MyPlate  ASA Prophylaxis  Lung CA Screening    Yaw Melendez MD  [facility]    Identified Health Risks:  "

## 2022-07-27 NOTE — PATIENT INSTRUCTIONS
Patient Education   Personalized Prevention Plan  You are due for the preventive services outlined below.  Your care team is available to assist you in scheduling these services.  If you have already completed any of these items, please share that information with your care team to update in your medical record.  Health Maintenance Due   Topic Date Due     Basic Metabolic Panel  01/21/2020     A1C Lab  04/04/2020     Eye Exam  04/22/2020     Cholesterol Lab  07/26/2022     Kidney Microalbumin Urine Test  07/26/2022     ANNUAL REVIEW OF HM ORDERS  07/26/2022       Understanding USDA MyPlate  The USDA has guidelines to help you make healthy food choices. These are called MyPlate. MyPlate shows the food groups that make up healthy meals using the image of a place setting. Before you eat, think about the healthiest choices for what to put on your plate or in your cup or bowl. To learn more about building a healthy plate, visit www.choosemyplate.gov.    The food groups    Fruits. Any fruit or 100% fruit juice counts as part of the Fruit Group. Fruits may be fresh, canned, frozen, or dried, and may be whole, cut-up, or pureed. Make 1/2 of your plate fruits and vegetables.    Vegetables. Any vegetable or 100% vegetable juice counts as a member of the Vegetable Group. Vegetables may be fresh, frozen, canned, or dried. They can be served raw or cooked and may be whole, cut-up, or mashed. Make 1/2 of your plate fruits and vegetables.    Grains. All foods made from grains are part of the Grains Group. These include wheat, rice, oats, cornmeal, and barley. Grains are often used to make foods such as bread, pasta, oatmeal, cereal, tortillas, and grits. Grains should be no more than 1/4 of your plate. At least half of your grains should be whole grains.    Protein. This group includes meat, poultry, seafood, beans and peas, eggs, processed soy products (such as tofu), nuts (including nut butters), and seeds. Make protein  choices no more than 1/4 of your plate. Meat and poultry choices should be lean or low fat.    Dairy. The Dairy Group includes all fluid milk products and foods made from milk that contain calcium, such as yogurt and cheese. (Foods that have little calcium, such as cream, butter, and cream cheese, are not part of this group.) Most dairy choices should be low-fat or fat-free.    Oils. Oils aren't a food group, but they do contain essential nutrients. However it's important to watch your intake of oils. These are fats that are liquid at room temperature. They include canola, corn, olive, soybean, vegetable, and sunflower oil. Foods that are mainly oil include mayonnaise, certain salad dressings, and soft margarines. You likely already get your daily oil allowance from the foods you eat.  Things to limit  Eating healthy also means limiting these things in your diet:       Salt (sodium). Many processed foods have a lot of sodium. To keep sodium intake down, eat fresh vegetables, meats, poultry, and seafood when possible. Purchase low-sodium, reduced-sodium, or no-salt-added food products at the store. And don't add salt to your meals at home. Instead, season them with herbs and spices such as dill, oregano, cumin, and paprika. Or try adding flavor with lemon or lime zest and juice.    Saturated fat. Saturated fats are most often found in animal products such as beef, pork, and chicken. They are often solid at room temperature, such as butter. To reduce your saturated fat intake, choose leaner cuts of meat and poultry. And try healthier cooking methods such as grilling, broiling, roasting, or baking. For a simple lower-fat swap, use plain nonfat yogurt instead of mayonnaise when making potato salad or macaroni salad.    Added sugars. These are sugars added to foods. They are in foods such as ice cream, candy, soda, fruit drinks, sports drinks, energy drinks, cookies, pastries, jams, and syrups. Cut down on added sugars  by sharing sweet treats with a family member or friend. You can also choose fruit for dessert, and drink water or other unsweetened beverages.     [employer] last reviewed this educational content on 6/1/2020 2000-2021 [employer]. All rights reserved. This information is not intended as a substitute for professional medical care. Always follow your healthcare professional's instructions.

## 2022-07-27 NOTE — PROGRESS NOTES
Last 3 months:   Has some eye crossing issues: ?Stroke, DM  Getting better.    Neville is a 75 yo M with Dm2, former smoker (1993)sergio, htn, hpld, diverticulosis, ddd, uric acid,     7/27: wellness, last visit 2/20    care gaps: A1c, foot, eye. albumin, LDL, BMP. covid, shingles, colon, fall, aaa    meds: asa, lipitor, lisino-hctz./cozaar.          The patient was counseled and encouraged to consider modifying their diet and eating habits. He was provided with information on recommended healthy diet options.

## 2023-07-28 ENCOUNTER — OFFICE VISIT (OUTPATIENT)
Dept: FAMILY MEDICINE | Facility: CLINIC | Age: 78
End: 2023-07-28
Payer: COMMERCIAL

## 2023-07-28 VITALS
HEIGHT: 71 IN | DIASTOLIC BLOOD PRESSURE: 70 MMHG | BODY MASS INDEX: 33.32 KG/M2 | WEIGHT: 238 LBS | RESPIRATION RATE: 18 BRPM | HEART RATE: 70 BPM | OXYGEN SATURATION: 97 % | SYSTOLIC BLOOD PRESSURE: 138 MMHG

## 2023-07-28 DIAGNOSIS — E78.5 HYPERLIPIDEMIA LDL GOAL <100: ICD-10-CM

## 2023-07-28 DIAGNOSIS — Z00.00 ENCOUNTER FOR MEDICARE ANNUAL WELLNESS EXAM: Primary | ICD-10-CM

## 2023-07-28 DIAGNOSIS — Z97.4 USES HEARING AID: ICD-10-CM

## 2023-07-28 DIAGNOSIS — E08.00 DIABETES MELLITUS DUE TO UNDERLYING CONDITION WITH HYPEROSMOLARITY WITHOUT COMA, WITHOUT LONG-TERM CURRENT USE OF INSULIN (H): ICD-10-CM

## 2023-07-28 DIAGNOSIS — I10 HYPERTENSION GOAL BP (BLOOD PRESSURE) < 140/90: ICD-10-CM

## 2023-07-28 LAB
ALBUMIN SERPL BCG-MCNC: 4.3 G/DL (ref 3.5–5.2)
ALP SERPL-CCNC: 78 U/L (ref 40–129)
ALT SERPL W P-5'-P-CCNC: 19 U/L (ref 0–70)
ANION GAP SERPL CALCULATED.3IONS-SCNC: 15 MMOL/L (ref 7–15)
AST SERPL W P-5'-P-CCNC: 24 U/L (ref 0–45)
BILIRUB SERPL-MCNC: 0.6 MG/DL
BUN SERPL-MCNC: 28.6 MG/DL (ref 8–23)
CALCIUM SERPL-MCNC: 9.7 MG/DL (ref 8.8–10.2)
CHLORIDE SERPL-SCNC: 102 MMOL/L (ref 98–107)
CHOLEST SERPL-MCNC: 143 MG/DL
CREAT SERPL-MCNC: 1.63 MG/DL (ref 0.67–1.17)
CREAT UR-MCNC: 151 MG/DL
DEPRECATED HCO3 PLAS-SCNC: 24 MMOL/L (ref 22–29)
GFR SERPL CREATININE-BSD FRML MDRD: 43 ML/MIN/1.73M2
GLUCOSE SERPL-MCNC: 196 MG/DL (ref 70–99)
HBA1C MFR BLD: 7.6 % (ref 0–5.6)
HDLC SERPL-MCNC: 26 MG/DL
LDLC SERPL CALC-MCNC: 58 MG/DL
MICROALBUMIN UR-MCNC: <12 MG/L
MICROALBUMIN/CREAT UR: NORMAL MG/G{CREAT}
NONHDLC SERPL-MCNC: 117 MG/DL
POTASSIUM SERPL-SCNC: 4.2 MMOL/L (ref 3.4–5.3)
PROT SERPL-MCNC: 6.9 G/DL (ref 6.4–8.3)
SODIUM SERPL-SCNC: 141 MMOL/L (ref 136–145)
TRIGL SERPL-MCNC: 293 MG/DL

## 2023-07-28 PROCEDURE — 80061 LIPID PANEL: CPT | Performed by: FAMILY MEDICINE

## 2023-07-28 PROCEDURE — 82043 UR ALBUMIN QUANTITATIVE: CPT | Performed by: FAMILY MEDICINE

## 2023-07-28 PROCEDURE — 83036 HEMOGLOBIN GLYCOSYLATED A1C: CPT | Performed by: FAMILY MEDICINE

## 2023-07-28 PROCEDURE — 80053 COMPREHEN METABOLIC PANEL: CPT | Performed by: FAMILY MEDICINE

## 2023-07-28 PROCEDURE — G0439 PPPS, SUBSEQ VISIT: HCPCS | Performed by: FAMILY MEDICINE

## 2023-07-28 PROCEDURE — 82570 ASSAY OF URINE CREATININE: CPT | Performed by: FAMILY MEDICINE

## 2023-07-28 PROCEDURE — 99207 PR FOOT EXAM NO CHARGE: CPT | Mod: 25 | Performed by: FAMILY MEDICINE

## 2023-07-28 PROCEDURE — 36415 COLL VENOUS BLD VENIPUNCTURE: CPT | Performed by: FAMILY MEDICINE

## 2023-07-28 ASSESSMENT — ENCOUNTER SYMPTOMS
FEVER: 0
JOINT SWELLING: 0
HEARTBURN: 0
ARTHRALGIAS: 0
HEMATOCHEZIA: 0
PALPITATIONS: 0
MYALGIAS: 1
NERVOUS/ANXIOUS: 0
NAUSEA: 0
CONSTIPATION: 0
ABDOMINAL PAIN: 0
DIZZINESS: 0
EYE PAIN: 0
PARESTHESIAS: 0
DYSURIA: 0
CHILLS: 0
COUGH: 0
FREQUENCY: 0
HEADACHES: 0
SORE THROAT: 0
DIARRHEA: 1
SHORTNESS OF BREATH: 1
WEAKNESS: 1

## 2023-07-28 ASSESSMENT — PAIN SCALES - GENERAL: PAINLEVEL: MILD PAIN (2)

## 2023-07-28 ASSESSMENT — ACTIVITIES OF DAILY LIVING (ADL): CURRENT_FUNCTION: NO ASSISTANCE NEEDED

## 2023-07-28 NOTE — PATIENT INSTRUCTIONS
Patient Education   Personalized Prevention Plan  You are due for the preventive services outlined below.  Your care team is available to assist you in scheduling these services.  If you have already completed any of these items, please share that information with your care team to update in your medical record.  Health Maintenance Due   Topic Date Due     LUNG CANCER SCREENING  Never done     Basic Metabolic Panel  01/21/2020     A1C Lab  04/04/2020     Eye Exam  04/22/2020     Cholesterol Lab  07/26/2022     Kidney Microalbumin Urine Test  07/26/2022     ANNUAL REVIEW OF HM ORDERS  07/26/2022     COVID-19 Vaccine (6 - Moderna series) 02/12/2023     Diabetic Foot Exam  07/27/2023     Annual Wellness Visit  07/27/2023     Hearing Loss: Care Instructions  Overview     Hearing loss is a sudden or slow decrease in how well you hear. It can range from slight to profound. Permanent hearing loss can occur with aging. It also can happen when you are exposed long-term to loud noise. Examples include listening to loud music, riding motorcycles, or being around other loud machines.  Hearing loss can affect your work and home life. It can make you feel lonely or depressed. You may feel that you have lost your independence. But hearing aids and other devices can help you hear better and feel connected to others.  Follow-up care is a key part of your treatment and safety. Be sure to make and go to all appointments, and call your doctor if you are having problems. It's also a good idea to know your test results and keep a list of the medicines you take.  How can you care for yourself at home?  Avoid loud noises whenever possible. This helps keep your hearing from getting worse.  Always wear hearing protection around loud noises.  Wear a hearing aid as directed.  A professional can help you pick a hearing aid that will work best for you.  You can also get hearing aids over the counter for mild to moderate hearing loss.  Have  "hearing tests as your doctor suggests. They can show whether your hearing has changed. Your hearing aid may need to be adjusted.  Use other devices as needed. These may include:  Telephone amplifiers and hearing aids that can connect to a television, stereo, radio, or microphone.  Devices that use lights or vibrations. These alert you to the doorbell, a ringing telephone, or a baby monitor.  Television closed-captioning. This shows the words at the bottom of the screen. Most new TVs can do this.  TTY (text telephone). This lets you type messages back and forth on the telephone instead of talking or listening. These devices are also called TDD. When messages are typed on the keyboard, they are sent over the phone line to a receiving TTY. The message is shown on a monitor.  Use text messaging, social media, and email if it is hard for you to communicate by telephone.  Try to learn a listening technique called speechreading. It is not lipreading. You pay attention to people's gestures, expressions, posture, and tone of voice. These clues can help you understand what a person is saying. Face the person you are talking to, and have them face you. Make sure the lighting is good. You need to see the other person's face clearly.  Think about counseling if you need help to adjust to your hearing loss.  When should you call for help?  Watch closely for changes in your health, and be sure to contact your doctor if:    You think your hearing is getting worse.     You have new symptoms, such as dizziness or nausea.   Where can you learn more?  Go to https://www.[employer].net/patiented  Enter R798 in the search box to learn more about \"Hearing Loss: Care Instructions.\"  Current as of: March 1, 2023               Content Version: 13.7    8620-6473 [employer].   Care instructions adapted under license by your healthcare professional. If you have questions about a medical condition or this instruction, always ask your " healthcare professional. [employer] disclaims any warranty or liability for your use of this information.      Preventing Falls: Care Instructions    Talk to your doctor about the medicines you take. Ask if any of them increase the risk of falls and whether they can be changed or stopped.   Try to exercise regularly. It can help improve your strength and balance. This can help lower your risk of falling.     Practice fall safety and prevention.    Wear low-heeled shoes that fit well and give your feet good support. Talk to your doctor if you have foot problems that make this hard.  Carry a cellphone or wear a medical alert device that you can use to call for help.  Use stepladders instead of chairs to reach high objects. Don't climb if you're at risk for falls. Ask for help, if needed.  Wear the correct eyeglasses, if you need them.    Make your home safer.    Remove rugs, cords, clutter, and furniture from walkways.  Keep your house well lit. Use night-lights in hallways and bathrooms.  Install and use sturdy handrails on stairways.  Wear nonskid footwear, even inside. Don't walk barefoot or in socks without shoes.    Be safe outside.    Use handrails, curb cuts, and ramps whenever possible.  Keep your hands free by using a shoulder bag or backpack.  Try to walk in well-lit areas. Watch out for uneven ground, changes in pavement, and debris.  Be careful in the winter. Walk on the grass or gravel when sidewalks are slippery. Use de-icer on steps and walkways. Add non-slip devices to shoes.    Put grab bars and nonskid mats in your shower or tub and near the toilet. Try to use a shower chair or bath bench when bathing.   Get into a tub or shower by putting in your weaker leg first. Get out with your strong side first. Have a phone or medical alert device in the bathroom with you.   Where can you learn more?  Go to https://www.[employer].net/patiented  Enter G117 in the search box to learn more about  "\"Preventing Falls: Care Instructions.\"  Current as of: November 9, 2022               Content Version: 13.7    6462-8898 [employer].   Care instructions adapted under license by your healthcare professional. If you have questions about a medical condition or this instruction, always ask your healthcare professional. [employer] disclaims any warranty or liability for your use of this information.      How to Get Up Safely After a Fall: Care Instructions  Overview     If you have injuries, health problems, or other reasons that may make it easy for you to fall at home, it is a good idea to learn how to get up safely after a fall. Learning how to get up correctly can help you avoid making an injury worse.  Also, knowing what to do if you cannot get up can help you stay safe until help arrives.  Follow-up care is a key part of your treatment and safety. Be sure to make and go to all appointments, and call your doctor if you are having problems. It's also a good idea to know your test results and keep a list of the medicines you take.  How can you care for yourself after a fall?  If you think you can get up  First lie still for a few minutes and think about how you feel. If your body feels okay and you think you can get up safely, follow the rest of the steps below:  Look for a chair or other piece of furniture that is close to you.  Roll onto your side and rest. Roll by turning your head in the direction you want to roll, move your shoulder and arm, then hip and leg in the same direction.  Lie still for a moment to let your blood pressure adjust.  Slowly push your upper body up, lift your head, and take a moment to rest.  Slowly get up on your hands and knees, and crawl to the chair or other stable piece of furniture.  Put your hands on the chair.  Move one foot forward, and place it flat on the floor. Your other leg should be bent with the knee on the floor.  Rise slowly, turn your body, and " "sit in the chair. Stay seated for a bit and think about how you feel. Call for help. Even if you feel okay, let someone know what happened to you. You might not know that you have a serious injury.  If you cannot get up  If you think you are injured after a fall or you cannot get up, try not to panic.  Call out for help.  If you have a phone within reach or you have an emergency call device, use it to call for help.  If you do not have a phone within reach, try to slide yourself toward it. If you cannot get to the phone, try to slide toward a door or window or a place where you think you can be heard.  [place] or use an object to make noise so someone might hear you.  If you can reach something that you can use for a pillow, place it under your head. Try to stay warm by covering yourself with a blanket or clothing while you wait for help.  When should you call for help?   Call 911 anytime you think you may need emergency care. For example, call if:    You passed out (lost consciousness).     You cannot get up after a fall.     You have severe pain.   Call your doctor now or seek immediate medical care if:    You have new or worse pain.     You are dizzy or lightheaded.     You hit your head.   Watch closely for changes in your health, and be sure to contact your doctor if:    You do not get better as expected.   Where can you learn more?  Go to https://www.[employer].net/patiented  Enter G513 in the search box to learn more about \"How to Get Up Safely After a Fall: Care Instructions.\"  Current as of: November 14, 2022               Content Version: 13.7    6551-8780 [employer].   Care instructions adapted under license by your healthcare professional. If you have questions about a medical condition or this instruction, always ask your healthcare professional. [employer] disclaims any warranty or liability for your use of this information.         "

## 2023-07-28 NOTE — PROGRESS NOTES
The patient was provided with written information regarding signs of hearing loss.  He is at risk for falling and has been provided with information to reduce the risk of falling at home.

## 2023-07-28 NOTE — PROGRESS NOTES
"SUBJECTIVE:   Neville is a 78 year old who presents for Preventive Visit.      7/28/2023     7:18 AM   Additional Questions   Roomed by Nesha GANDARA   Accompanied by self   Neville is a 77 yo M with DM2, former smoker (1993), sergio, htn, hpld, diverticulosis, ddd, uric acid,     7/27: wellness, last labs 2/21    care gaps: A1c, foot, eye. albumin, LDL, BMP. Covid#5, shingles, tdap, fall, aaa    meds: asa, lipitor, lisino-hctz./cozaar.    Concerns:   Chest pain: as a result of fall while fishing    HTN:   [facility]    DM2:   [facility]   Good though gets diarrhea from Metformin    Hearing: Has hearing aids  Vision: is pretty good; [facility] and [facility]  Bladder control: good  Memory: good  ADLs: Lives with wife and are independent    [facility].    Are you in the first 12 months of your Medicare coverage?  No    Healthy Habits:     In general, how would you rate your overall health?  Good    Frequency of exercise:  2-3 days/week    Duration of exercise:  15-30 minutes    Do you usually eat at least 4 servings of fruit and vegetables a day, include whole grains    & fiber and avoid regularly eating high fat or \"junk\" foods?  Yes    Taking medications regularly:  Yes    Medication side effects:  Other    Ability to successfully perform activities of daily living:  No assistance needed    Home Safety:  No safety concerns identified    Hearing Impairment:  Difficulty following a conversation in a noisy restaurant or crowded room, feel that people are mumbling or not speaking clearly, need to ask people to speak up or repeat themselves and difficulty understanding soft or whispered speech    In the past 6 months, have you been bothered by leaking of urine?  No    In general, how would you rate your overall mental or emotional health?  Good    Additional concerns today:  No    Have you ever done Advance Care Planning? (For example, a Health " Directive, POLST, or a discussion with a medical provider or your loved ones about your wishes): Yes, patient states has an Advance Care Planning document and will bring a copy to the clinic.     Fall risk  Fallen 2 or more times in the past year?: No  Any fall with injury in the past year?: Yes    Cognitive Screening   1) Repeat 3 items (Leader, Season, Table)    2) Clock draw: NORMAL  3) 3 item recall: Recalls 2 objects   Results: NORMAL clock, 1-2 items recalled: COGNITIVE IMPAIRMENT LESS LIKELY    Mini-CogTM Copyright JULIOCESAR Horn. Licensed by the author for use in [facility]; reprinted with permission (navjot@[facility]). All rights reserved.      Do you have sleep apnea, excessive snoring or daytime drowsiness? : no    Reviewed and updated as needed this visit by clinical staff   Tobacco  Allergies  Meds              Reviewed and updated as needed this visit by Provider                 Social History     Tobacco Use     Smoking status: Former     Years: 30.00     Types: Cigarettes     Quit date: 8/10/1993     Years since quittin.9     Smokeless tobacco: Never   Substance Use Topics     Alcohol use: No           2023     7:16 AM   Alcohol Use   Prescreen: >3 drinks/day or >7 drinks/week? Not Applicable          No data to display              Do you have a current opioid prescription? No  Do you use any other controlled substances or medications that are not prescribed by a provider? None    Current providers sharing in care for this patient include: Patient Care Team:  Yaw Melendez MD as Assigned PCP    The following health maintenance items are reviewed in Epic and correct as of today:  Health Maintenance   Topic Date Due     LUNG CANCER SCREENING  Never done     BMP  2020     A1C  2020     EYE EXAM  2020     LIPID  2022     MICROALBUMIN  2022     ANNUAL REVIEW OF HM ORDERS  2022     COVID-19 Vaccine (6 - Moderna series) 2023     DIABETIC  "FOOT EXAM  07/27/2023     MEDICARE ANNUAL WELLNESS VISIT  07/27/2023     INFLUENZA VACCINE (1) 09/01/2023     FALL RISK ASSESSMENT  07/28/2024     ADVANCE CARE PLANNING  07/27/2027     DTAP/TDAP/TD IMMUNIZATION (6 - Td or Tdap) 08/05/2032     HEPATITIS C SCREENING  Completed     PHQ-2 (once per calendar year)  Completed     Pneumococcal Vaccine: 65+ Years  Completed     ZOSTER IMMUNIZATION  Completed     IPV IMMUNIZATION  Aged Out     MENINGITIS IMMUNIZATION  Aged Out     COLORECTAL CANCER SCREENING  Discontinued     Review of Systems   Constitutional:  Negative for chills and fever.   HENT:  Positive for hearing loss. Negative for congestion, ear pain and sore throat.    Eyes:  Negative for pain and visual disturbance.   Respiratory:  Positive for shortness of breath. Negative for cough.    Cardiovascular:  Positive for chest pain. Negative for palpitations and peripheral edema.   Gastrointestinal:  Positive for diarrhea. Negative for abdominal pain, constipation, heartburn, hematochezia and nausea.   Genitourinary:  Negative for dysuria, frequency, genital sores, impotence and urgency.   Musculoskeletal:  Positive for myalgias. Negative for arthralgias and joint swelling.   Skin:  Negative for rash.   Neurological:  Positive for weakness. Negative for dizziness, headaches and paresthesias.   Psychiatric/Behavioral:  Negative for mood changes. The patient is not nervous/anxious.      OBJECTIVE:   BP (!) 160/98 (BP Location: Left arm, Cuff Size: Adult Regular)   Pulse 70   Resp 18   Ht 1.8 m (5' 10.87\")   Wt 108 kg (238 lb)   SpO2 97%   BMI 33.32 kg/m   Estimated body mass index is 33.32 kg/m  as calculated from the following:    Height as of this encounter: 1.8 m (5' 10.87\").    Weight as of this encounter: 108 kg (238 lb).  Physical Exam  GENERAL: healthy, alert and no distress  EYES: Eyes grossly normal to inspection, PERRL and conjunctivae and sclerae normal  HENT: ear canals and TM's normal, nose and " mouth without ulcers or lesions  NECK: no adenopathy  and thyroid normal to palpation  RESP: lungs clear to auscultation - no rales, rhonchi or wheezes  CV: regular rate and rhythm, normal S1 S2, no S3 or S4, no murmur, click or rub, no peripheral edema   ABDOMEN: soft, nontender, no masses and bowel sounds normal  MS: no gross musculoskeletal defects noted, no edema  SKIN: no suspicious lesions or rashes  NEURO: Normal strength and tone, mentation intact and speech normal  PSYCH: mentation appears normal, affect normal/bright    ASSESSMENT / PLAN:   Neville was seen today for physical.    Diagnoses and all orders for this visit:    Encounter for Medicare annual wellness exam    Hypertension goal BP (blood pressure) < 140/90  -     Comprehensive metabolic panel (BMP + Alb, Alk Phos, ALT, AST, Total. Bili, TP); Future  -     Comprehensive metabolic panel (BMP + Alb, Alk Phos, ALT, AST, Total. Bili, TP)    Hyperlipidemia LDL goal <100  -     Lipid panel reflex to direct LDL Non-fasting; Future  -     Comprehensive metabolic panel (BMP + Alb, Alk Phos, ALT, AST, Total. Bili, TP); Future  -     Lipid panel reflex to direct LDL Non-fasting  -     Comprehensive metabolic panel (BMP + Alb, Alk Phos, ALT, AST, Total. Bili, TP)    Diabetes mellitus due to underlying condition with hyperosmolarity without coma, without long-term current use of insulin (H)  -     HEMOGLOBIN A1C; Future  -     Albumin Random Urine Quantitative with Creat Ratio; Future  -     FOOT EXAM  -     Comprehensive metabolic panel (BMP + Alb, Alk Phos, ALT, AST, Total. Bili, TP); Future  -     HEMOGLOBIN A1C  -     Albumin Random Urine Quantitative with Creat Ratio  -     Comprehensive metabolic panel (BMP + Alb, Alk Phos, ALT, AST, Total. Bili, TP)    Uses hearing aid    BMI 33.0-33.9,adult       -  Counseled to make better food choices, exercise as tolerated, and lose weight.     Other orders  -     PRIMARY CARE FOLLOW-UP SCHEDULING; Future    Patient  has been advised of split billing requirements and indicates understanding: Yes      COUNSELING:  Reviewed preventive health counseling, as reflected in patient instructions       Regular exercise       Healthy diet/nutrition       Bladder control       Fall risk prevention        He reports that he quit smoking about 29 years ago. He has never used smokeless tobacco.      Appropriate preventive services were discussed with this patient, including applicable screening as appropriate for cardiovascular disease, diabetes, osteopenia/osteoporosis, and glaucoma.  As appropriate for age/gender, discussed screening for colorectal cancer, prostate cancer, breast cancer, and cervical cancer. Checklist reviewing preventive services available has been given to the patient.    Reviewed patients plan of care and provided an AVS. The Basic Care Plan (routine screening as documented in Health Maintenance) for Neville meets the Care Plan requirement. This Care Plan has been established and reviewed with the Patient.    {Counseling Resources  [place] Preventive Services Task Force  Cholesterol Screening  Health diet/nutrition  Pooled Cohorts Equation Calculator  I-DISPO's MyPlate  ASA Prophylaxis  Lung CA Screening  Osteoporosis prevention/bone health  {Prostate Cancer Screening  Consider for men 55-69 per guidance from USPSTF     Yaw Melendez MD  [facility]    Identified Health Risks:    I have reviewed Opioid Use Disorder and Substance Use Disorder risk factors and made any needed referrals.

## 2023-07-28 NOTE — LETTER
August 1, 2023    Neville Bonilla  [address] [place] [address]          Dear ,    We are writing to inform you of your test results.   Your liver and kidney function show an elevated creatinine and decreased GFR, would recommend rechecking the labs in 1 month. A1c for diabetes has gone up to 7.6 from 6.2 three years ago [; I would recommend starting a second dose for diabetes and possibly stopping Metformin. [facility].  Cholesterol levels show low good cholesterol (HDL); exercise and weight loss can boost the number.     Let me know your thoughts.      Resulted Orders   HEMOGLOBIN A1C   Result Value Ref Range    Hemoglobin A1C 7.6 (H) 0.0 - 5.6 %      Comment:      Normal <5.7%   Prediabetes 5.7-6.4%    Diabetes 6.5% or higher     Note: Adopted from ADA consensus guidelines.   Lipid panel reflex to direct LDL Non-fasting   Result Value Ref Range    Cholesterol 143 <200 mg/dL    Triglycerides 293 (H) <150 mg/dL    Direct Measure HDL 26 (L) >=40 mg/dL    LDL Cholesterol Calculated 58 <=100 mg/dL    Non HDL Cholesterol 117 <130 mg/dL    Narrative    Cholesterol  Desirable:  <200 mg/dL    Triglycerides  Normal:  Less than 150 mg/dL  Borderline High:  150-199 mg/dL  High:  200-499 mg/dL  Very High:  Greater than or equal to 500 mg/dL    Direct Measure HDL  Female:  Greater than or equal to 50 mg/dL   Male:  Greater than or equal to 40 mg/dL    LDL Cholesterol  Desirable:  <100mg/dL  Above Desirable:  100-129 mg/dL   Borderline High:  130-159 mg/dL   High:  160-189 mg/dL   Very High:  >= 190 mg/dL    Non HDL Cholesterol  Desirable:  130 mg/dL  Above Desirable:  130-159 mg/dL  Borderline High:  160-189 mg/dL  High:  190-219 mg/dL  Very High:  Greater than or equal to 220 mg/dL   Albumin Random Urine Quantitative with Creat Ratio   Result Value Ref Range    Creatinine Urine mg/dL 151.0 mg/dL      Comment:      The reference ranges have not been established in urine  creatinine. The results should be integrated into the clinical context for interpretation.    Albumin Urine mg/L <12.0 mg/L      Comment:      The reference ranges have not been established in urine albumin. The results should be integrated into the clinical context for interpretation.    Albumin Urine mg/g Cr        Comment:      Unable to calculate, urine albumin and/or urine creatinine is outside detectable limits.  Microalbuminuria is defined as an albumin:creatinine ratio of 17 to 299 for males and 25 to 299 for females. A ratio of albumin:creatinine of 300 or higher is indicative of overt proteinuria.  Due to biologic variability, positive results should be confirmed by a second, first-morning random or 24-hour timed urine specimen. If there is discrepancy, a third specimen is recommended. When 2 out of 3 results are in the microalbuminuria range, this is evidence for incipient nephropathy and warrants increased efforts at glucose control, blood pressure control, and institution of therapy with an angiotensin-converting-enzyme (ACE) inhibitor (if the patient can tolerate it).     Comprehensive metabolic panel (BMP + Alb, Alk Phos, ALT, AST, Total. Bili, TP)   Result Value Ref Range    Sodium 141 136 - 145 mmol/L    Potassium 4.2 3.4 - 5.3 mmol/L    Chloride 102 98 - 107 mmol/L    Carbon Dioxide (CO2) 24 22 - 29 mmol/L    Anion Gap 15 7 - 15 mmol/L    Urea Nitrogen 28.6 (H) 8.0 - 23.0 mg/dL    Creatinine 1.63 (H) 0.67 - 1.17 mg/dL    Calcium 9.7 8.8 - 10.2 mg/dL    Glucose 196 (H) 70 - 99 mg/dL    Alkaline Phosphatase 78 40 - 129 U/L    AST 24 0 - 45 U/L      Comment:      Reference intervals for this test were updated on 6/12/2023 to more accurately reflect our healthy population. There may be differences in the flagging of prior results with similar values performed with this method. Interpretation of those prior results can be made in the context of the updated reference intervals.    ALT 19 0 - 70 U/L       Comment:      Reference intervals for this test were updated on 6/12/2023 to more accurately reflect our healthy population. There may be differences in the flagging of prior results with similar values performed with this method. Interpretation of those prior results can be made in the context of the updated reference intervals.      Protein Total 6.9 6.4 - 8.3 g/dL    Albumin 4.3 3.5 - 5.2 g/dL    Bilirubin Total 0.6 <=1.2 mg/dL    GFR Estimate 43 (L) >60 mL/min/1.73m2       If you have any questions or concerns, please call the clinic at the number listed above.       Sincerely,      Yaw Melendez MD

## 2024-06-14 ENCOUNTER — TRANSFERRED RECORDS (OUTPATIENT)
Dept: HEALTH INFORMATION MANAGEMENT | Facility: CLINIC | Age: 79
End: 2024-06-14
Payer: MEDICARE

## 2024-06-17 ENCOUNTER — TRANSCRIBE ORDERS (OUTPATIENT)
Dept: OTHER | Age: 79
End: 2024-06-17

## 2024-06-17 DIAGNOSIS — J01.01 ACUTE RECURRENT MAXILLARY SINUSITIS: Primary | ICD-10-CM

## 2024-06-21 NOTE — PROGRESS NOTES
Assessment & Plan     4-6 week follow-up     Problem List Items Addressed This Visit    None  Visit Diagnoses       Chronic rhinitis    -  Primary    Relevant Medications    azelastine (ASTELIN) 0.1 % nasal spray    Other Relevant Orders    CT Sinus w/o Contrast    Adult Allergy/Asthma  Referral    Nasal obstruction        Relevant Orders    CT Sinus w/o Contrast    Acute recurrent maxillary sinusitis        Relevant Medications    azelastine (ASTELIN) 0.1 % nasal spray              21 minutes spent on the date of the encounter doing chart review, history and exam, documentation and further activities per the note  {     MELISSA Hanson  [facility]    Subjective     HPI     Patient hx chronic b/l tinnitus and SNHL [facility] s[facility] RT sudden hearing loss and vertigo.  Vertigo seemed to improve with IT steroids but not the hearing- Patient fitted with [employer] Hearing aids.   Had brain MRI for this which possibly showed some sinus issues but records not available.  [facility]      Patient here with us today primarily for chronic nasal obstruction, chronic rhinitis, halitosis,     Hx distant nasal fracture.  No vasomotor triggers.  Never tested for allergies but symptoms are year round.      Saline rinses help briefly. Flonase wasn't helpful.       Sino-Nasal Outcome Test (SNOT - 22)    1. Need to Blow Nose: (P) Severe  2. Nasal Blockage: (P) Severe  3. Sneezing: (P) Moderate  4. Runny Nose: (P) Bad as it can be  5. Cough: (P) Moderate  6. Post-nasal discharge: (P) Severe  7. Thick nasal discharge: (P) Severe  8. Ear fullness: (P) Severe  9. Dizziness: (P) Bad as it can be  10. Ear Pain: (P) Severe  11. Facial pain/pressure: (P) Severe  12. Decreased Sense of Smell/Taste: (P) Moderate  13. Difficulty falling asleep: (P) Severe  14. Wake up at night: (P) Severe  15. Lack of a good night's sleep: (P) Severe  16. Wake up tired: (P) Bad as it can  be  17. Fatigue: (P) Severe  18. Reduced Productivity: (P) Moderate  19. Reduced Concentration: (P) Severe  20. Frustrated/restless/irritable: (P) Bad as it can be  21. Sad: (P) Severe  22. Embarrassed: (P) Moderate    Total Score: (P) 87    COPYRIGHT 1996. [facility] [place], [place]       Review of Systems   ENT as above      Objective    There were no vitals taken for this visit.    Physical Exam     Constitutional:   The patient was in no acute distress.      Head/Face:   Normocephalic and atraumatic.  No lesions or scars.     Ears:  The tympanic membranes are normal in appearance, bony landmarks are intact.  No retraction, perforation, or masses.   No fluid or purulence was seen in the external canal or the middle ear. No evidence of infection of the middle ear or external canal, cerumen was normal in appearance.    Nose:  Anterior rhinoscopy revealed LEFT deviated septum and absence of purulence or polyps.      Mouth:  Normal tongue, floor of mouth, buccal mucosa, and palate.  No lesions, ulceration or  masses on inspection, normal voice quality      Oropharynx:  Normal mucosa, palate symmetric with normal elevation. Tonsils  not visualized    Neck:  Supple with normal laryngeal and tracheal landmarks. No palpable thyroid.     Lymphatic:  There is no palpable lymphadenopathy in the neck.

## 2024-07-02 ENCOUNTER — OFFICE VISIT (OUTPATIENT)
Dept: OTOLARYNGOLOGY | Facility: CLINIC | Age: 79
End: 2024-07-02
Payer: COMMERCIAL

## 2024-07-02 DIAGNOSIS — J34.89 NASAL OBSTRUCTION: ICD-10-CM

## 2024-07-02 DIAGNOSIS — J01.01 ACUTE RECURRENT MAXILLARY SINUSITIS: ICD-10-CM

## 2024-07-02 DIAGNOSIS — J31.0 CHRONIC RHINITIS: Primary | ICD-10-CM

## 2024-07-02 PROCEDURE — 99204 OFFICE O/P NEW MOD 45 MIN: CPT | Performed by: PHYSICIAN ASSISTANT

## 2024-07-02 RX ORDER — AZELASTINE 1 MG/ML
1 SPRAY, METERED NASAL 2 TIMES DAILY
Qty: 30 ML | Refills: 11 | Status: SHIPPED | OUTPATIENT
Start: 2024-07-02

## 2024-07-02 NOTE — LETTER
7/2/2024      Neville Bonilla  [address] [place] [address]      Dear Colleague,    Thank you for referring your patient, Neville Bonilla, to the [facility]. Please see a copy of my visit note below.    Assessment & Plan    4-6 week follow-up     Problem List Items Addressed This Visit    None  Visit Diagnoses       Chronic rhinitis    -  Primary    Relevant Medications    azelastine (ASTELIN) 0.1 % nasal spray    Other Relevant Orders    CT Sinus w/o Contrast    Adult Allergy/Asthma  Referral    Nasal obstruction        Relevant Orders    CT Sinus w/o Contrast    Acute recurrent maxillary sinusitis        Relevant Medications    azelastine (ASTELIN) 0.1 % nasal spray              21 minutes spent on the date of the encounter doing chart review, history and exam, documentation and further activities per the note  {     MELISSA Hanson  [facility]    Subjective     HPI     Patient hx chronic b/l tinnitus and SNHL [facility] s/p episode RT sudden hearing loss and vertigo.  Vertigo seemed to improve with IT steroids but not the hearing- Patient fitted with [employer] Hearing aids.   Had brain MRI for this which possibly showed some sinus issues but records not available.  [facility]      Patient here with us today primarily for chronic nasal obstruction, chronic rhinitis, halitosis,     Hx distant nasal fracture.  No vasomotor triggers.  Never tested for allergies but symptoms are year round.      Saline rinses help briefly. Flonase wasn't helpful.       Sino-Nasal Outcome Test (SNOT - 22)    1. Need to Blow Nose: (P) Severe  2. Nasal Blockage: (P) Severe  3. Sneezing: (P) Moderate  4. Runny Nose: (P) Bad as it can be  5. Cough: (P) Moderate  6. Post-nasal discharge: (P) Severe  7. Thick nasal discharge: (P) Severe  8. Ear fullness: (P) Severe  9. Dizziness: (P) Bad as it can be  10. Ear Pain: (P) Severe  11. Facial  pain/pressure: (P) Severe  12. Decreased Sense of Smell/Taste: (P) Moderate  13. Difficulty falling asleep: (P) Severe  14. Wake up at night: (P) Severe  15. Lack of a good night's sleep: (P) Severe  16. Wake up tired: (P) Bad as it can be  17. Fatigue: (P) Severe  18. Reduced Productivity: (P) Moderate  19. Reduced Concentration: (P) Severe  20. Frustrated/restless/irritable: (P) Bad as it can be  21. Sad: (P) Severe  22. Embarrassed: (P) Moderate    Total Score: (P) 87    COPYRIGHT 1996. [facility] [place] [facility],[place]       Review of Systems   ENT as above      Objective    There were no vitals taken for this visit.    Physical Exam     Constitutional:   The patient was in no acute distress.      Head/Face:   Normocephalic and atraumatic.  No lesions or scars.     Ears:  The tympanic membranes are normal in appearance, bony landmarks are intact.  No retraction, perforation, or masses.   No fluid or purulence was seen in the external canal or the middle ear. No evidence of infection of the middle ear or external canal, cerumen was normal in appearance.    Nose:  Anterior rhinoscopy revealed LEFT deviated septum and absence of purulence or polyps.      Mouth:  Normal tongue, floor of mouth, buccal mucosa, and palate.  No lesions, ulceration or  masses on inspection, normal voice quality      Oropharynx:  Normal mucosa, palate symmetric with normal elevation. Tonsils  not visualized    Neck:  Supple with normal laryngeal and tracheal landmarks. No palpable thyroid.     Lymphatic:  There is no palpable lymphadenopathy in the neck.             Again, thank you for allowing me to participate in the care of your patient.        Sincerely,        MELISSA Hanson

## 2024-07-02 NOTE — PATIENT INSTRUCTIONS
Chronic Sinusitis: Care Instructions  Overview     Sinusitis is an inflammation of the mucous membranes inside the nose and sinuses. Sinuses are the hollow spaces in your skull around the eyes and nose. Sinusitis can cause pain and pressure in your head and face along with a stuffy or blocked nose. It can also cause thick, discolored mucus that drains from the nose or down the back of the throat. If these symptoms last 12 weeks or longer, you may have chronic sinusitis.  Chronic sinusitis is caused by long-term swelling of the sinuses and nasal passages. Other things, such as allergies and nasal polyps, may also be involved. A deviated nasal septum can also make it worse.  When the mucous membranes that line the sinuses get inflamed, they swell and make more mucus. The swelling can block the normal drainage of fluid from the sinuses into the nose and throat. If the fluid and mucus can't drain, they build up over time. This can make future sinus infections more likely. Chronic sinusitis can be hard to treat.  You will likely need a steroid nasal spray. Nasal washes are an important part of your treatment too. Antibiotics may be used if there's a bacterial infection. Other medicines may be needed. Surgery may be recommended if your symptoms don't get better after treatment.  Follow-up care is a key part of your treatment and safety. Be sure to make and go to all appointments, and call your doctor if you are having problems. It's also a good idea to know your test results and keep a list of the medicines you take.  How can you care for yourself at home?  Medicines    Your doctor will likely recommend a steroid nasal spray. Sometimes steroids are used as a wash, drops, or pills. Take this and other medicines exactly as prescribed.     If needed, ask your doctor if you can take an over-the-counter pain medicine, such as acetaminophen (Tylenol), ibuprofen (Advil, Motrin), or naproxen (Aleve). Be safe with medicines. Read  and follow all instructions on the label.     If your doctor prescribed antibiotics, take them as directed. Do not stop taking them just because you feel better. You need to take the full course of antibiotics.   At home    Use saline (saltwater) nasal washes every day. This helps keep your nasal passages open. It also can wash out mucus and allergens.  You can buy saline nasal washes at a grocery store or drugstore. Follow the instructions on the package.  You can make your own at home. Add 1 teaspoon of non-iodized salt and 1 teaspoon of baking soda to 2 cups of distilled or boiled and cooled water. Fill a squeeze bottle or neti pot with the nasal wash. Then put the tip into your nostril, and lean over the sink. With your mouth open, gently squirt the liquid. Repeat on the other side.     Do not smoke or breathe secondhand smoke. Smoking can make sinusitis worse. If you need help quitting, talk to your doctor about stop-smoking programs and medicines. These can increase your chances of quitting for good.     Breathe warm, moist air. You can use a steamy shower, a hot bath, or a sink filled with hot water. Avoid cold, dry air. Using a humidifier in your home may help. Follow the instructions for cleaning the machine.   When should you call for help?   Call your doctor now or seek immediate medical care if:    You have new or worse swelling, redness, or pain in your face or around one or both of your eyes.     You have double vision or a change in your vision.     You have a high fever.     You have a severe headache and a stiff neck.     You have mental changes, such as feeling confused or much less alert.   Watch closely for changes in your health, and be sure to contact your doctor if:    You have symptoms of a new sinus infection that lasts longer than 7 to 10 days. These symptoms may include the following:  You have new or worse facial pain.  The mucus from your nose becomes thicker (like pus) or has new blood  "in it.  Your stuffy nose and congestion get worse.     You do not get better as expected.   Where can you learn more?  Go to https://www.[employer].net/patiented  Enter F824 in the search box to learn more about \"Chronic Sinusitis: Care Instructions.\"  Current as of: September 27, 2023               Content Version: 14.0    9223-8171 [employer].   Care instructions adapted under license by your healthcare professional. If you have questions about a medical condition or this instruction, always ask your healthcare professional. [employer] disclaims any warranty or liability for your use of this information. Rhinitis: Care Instructions    Overview  Rhinitis is swelling and irritation in the nose. Allergies and infections are often the cause. Your nose may run or feel stuffy. Other symptoms are itchy and sore eyes, ears, throat, and mouth.  If allergies are the cause, your doctor may do tests to find out what you are allergic to. You may be able to stop symptoms if you avoid the things that cause them. Your doctor may suggest or prescribe medicine to ease your symptoms.    Follow-up care is a key part of your treatment and safety. Be sure to make and go to all appointments, and call your doctor if you are having problems. It's also a good idea to know your test results and keep a list of the medicines you take.    How can you care for yourself at home?  If your rhinitis is caused by allergies, try to find out what sets off (triggers) your symptoms. Take steps to avoid your triggers.  Avoid yard work. It can stir up both pollen and mold.  Do not smoke or allow others to smoke around you. If you need help quitting, talk to your doctor about stop-smoking programs and medicines. These can increase your chances of quitting for good.  Do not use aerosol sprays, cleaning products, or perfumes.  If pollen is one of your triggers, close your house and car windows during blooming season.  Clean your " "house often to control dust.  Keep pets outside.  If your doctor recommends over-the-counter medicines to relieve symptoms, take your medicines exactly as prescribed. Call your doctor if you think you are having a problem with your medicine.  Use saline (saltwater) nasal washes to help keep your nasal passages open and wash out mucus and allergens. You can buy saline nose sprays at a grocery store or drugstore. Or you can make your own at home by adding 1 teaspoon of non-iodized salt and 1 teaspoon of baking soda to 2 cups of distilled or boiled and cooled water. If you make your own, fill a squeeze bottle or neti pot with the solution, insert the tip into your nostril, and lean over the sink. Gently squirt the solution with your mouth open and repeat on the other side.    When should you call for help?   Call your doctor now or seek immediate medical care if:    You are having trouble breathing.   Watch closely for changes in your health, and be sure to contact your doctor if:    Mucus from your nose gets thicker (like pus) or has new blood in it.     You have new or worse symptoms.     You do not get better as expected.     Where can you learn more?  Go to https://www.[employer].net/patiented  Enter M030 in the search box to learn more about \"Rhinitis: Care Instructions.\"    Current as of: September 27, 2023               Content Version: 14.0    2653-7062 [employer].   Care instructions adapted under license by your healthcare professional. If you have questions about a medical condition or this instruction, always ask your healthcare professional. [employer] disclaims any warranty or liability for your use of this information.   "

## 2024-07-12 ENCOUNTER — ANCILLARY PROCEDURE (OUTPATIENT)
Dept: CT IMAGING | Facility: CLINIC | Age: 79
End: 2024-07-12
Attending: PHYSICIAN ASSISTANT
Payer: COMMERCIAL

## 2024-07-12 ENCOUNTER — TELEPHONE (OUTPATIENT)
Dept: OTOLARYNGOLOGY | Facility: CLINIC | Age: 79
End: 2024-07-12
Payer: MEDICARE

## 2024-07-12 DIAGNOSIS — J34.89 NASAL OBSTRUCTION: ICD-10-CM

## 2024-07-12 DIAGNOSIS — J31.0 CHRONIC RHINITIS: ICD-10-CM

## 2024-07-12 DIAGNOSIS — J33.9 NASAL POLYP: ICD-10-CM

## 2024-07-12 DIAGNOSIS — J32.9 CHRONIC SINUSITIS, UNSPECIFIED LOCATION: Primary | ICD-10-CM

## 2024-07-12 PROCEDURE — 70486 CT MAXILLOFACIAL W/O DYE: CPT | Mod: TC | Performed by: RADIOLOGY

## 2024-07-12 NOTE — TELEPHONE ENCOUNTER
Please contact patient with CT results which showed significant sinus blockages on the right and probable polyps on the left.  It's possible the astelin may help with this somewhat but likely we will discuss surgery at his follow up- (which we can now schedule for him mid to late August as per previous staff message).    Has he ever been tested for allergies?  This is the most common cause of polyps.      How have his blood sugars been? (A1c results would be ideal).    Adiel Jay PA-C

## 2024-07-12 NOTE — TELEPHONE ENCOUNTER
Left message for patient to call back.   Also, pt should schedule a follow up with Adiel in Sept/early Oct when he calls back.   Guadalupe So RN on 7/12/2024 at 2:11 PM

## 2024-07-16 RX ORDER — PREDNISONE 10 MG/1
10 TABLET ORAL 2 TIMES DAILY
Qty: 20 TABLET | Refills: 0 | Status: SHIPPED | OUTPATIENT
Start: 2024-07-16 | End: 2024-07-17

## 2024-07-16 NOTE — TELEPHONE ENCOUNTER
sounds like a plan.  Please also let patient know that I have sent over a short course of low-dose prednisone.  This will probably raise his blood sugars a little bit while he is taking it .  If he is getting up into the mid to high 200s or more he should stop it and the me know.  I have also placed a referral to an allergist.    Adiel Jay PA-C

## 2024-07-17 ENCOUNTER — TELEPHONE (OUTPATIENT)
Dept: OTOLARYNGOLOGY | Facility: CLINIC | Age: 79
End: 2024-07-17
Payer: MEDICARE

## 2024-07-17 DIAGNOSIS — J32.9 CHRONIC SINUSITIS, UNSPECIFIED LOCATION: ICD-10-CM

## 2024-07-17 DIAGNOSIS — J33.9 NASAL POLYP: ICD-10-CM

## 2024-07-17 RX ORDER — PREDNISONE 10 MG/1
10 TABLET ORAL 2 TIMES DAILY
Qty: 20 TABLET | Refills: 0 | Status: SHIPPED | OUTPATIENT
Start: 2024-07-17 | End: 2024-07-17

## 2024-07-17 RX ORDER — PREDNISONE 10 MG/1
10 TABLET ORAL 2 TIMES DAILY
Qty: 20 TABLET | Refills: 0 | Status: SHIPPED | OUTPATIENT
Start: 2024-07-17

## 2024-07-17 NOTE — TELEPHONE ENCOUNTER
[facility]    Phone Message    May a detailed message be left on voicemail: yes     Reason for Call: Other: Pt is requesting to speak with DHRUV Butcher. He is returning a call back. Did try PL however provider was not available. Please call to discuss. Thanks      Action Taken: Message routed to:  Other: ENT     Travel Screening: Not Applicable     Date of Service:

## 2024-07-17 NOTE — TELEPHONE ENCOUNTER
Spoke to patient and verbalized understanding of Adiel' message. I let him know [facility] He will call back if he is having elevated blood sugars in the mid-to high 200s. He will wait for the call to schedule with an allergist.  Guadalupe So RN on 7/17/2024 at 9:46 AM

## 2024-07-30 ENCOUNTER — TELEPHONE (OUTPATIENT)
Dept: OTOLARYNGOLOGY | Facility: CLINIC | Age: 79
End: 2024-07-30
Payer: MEDICARE

## 2024-07-30 NOTE — TELEPHONE ENCOUNTER
Left message for pt's wife. Adiel' schedule is now open for [place] (mon and fri) if pt would like to schedule his follow up there instead of [place]? Adiel recommended following up early August to discuss CT results.     Guadalupe So RN on 7/30/2024 at 10:11 AM

## 2024-07-31 NOTE — TELEPHONE ENCOUNTER
[facility] Call [facility]    Phone Message    May a detailed message be left on voicemail: yes     Reason for Call: Other: Pt calling back. Writer unable to pull up template for this provider in [facility] or [facility]. Please advise. Thanks.     Action Taken: Other: ENT    Travel Screening: Not Applicable     Date of Service:

## 2024-08-01 NOTE — TELEPHONE ENCOUNTER
Spoke to Lesley, scheduled pt a follow up appointment in [place].   Guadalupe So RN on 8/1/2024 at 12:50 PM

## 2024-08-08 NOTE — PROGRESS NOTES
Assessment & Plan     Sinus symptoms generally significantly improved with astelin though halitosis persists,  given location of residual polyp may still be obstructing maxillary canal to some extent, will trial as below and await allergy consult,   6M follow-up     Problem List Items Addressed This Visit    None  Visit Diagnoses       Nasal polyposis    -  Primary    Relevant Orders    NASAL ENDOSCOPY, DIAGNOSTIC (Completed)    Halitosis        Relevant Medications    chlorhexidine (PERIDEX) 0.12 % solution                21 minutes spent on the date of the encounter doing chart review, history and exam, documentation and further activities per the note  {     MELISSA Hanson  [facility]    Subjective     HPI-  6 week follow-up for  chronic nasal obstruction, chronic rhinitis, halitosis. Started on astelin.   CT results   showed significant sinus blockages on the right and probable polyps on the RT , LEFT septal deviation posteriorly, tx'd with prednisone and allergy referral placed (appt in November).  Didn't tolerate the prednisone- caused dizziness but nonetheless - right side is feeling much better- less pain, headaches, and easier to breathe. .            Review of Systems   ENT as above      Objective    BP (!) 161/92   Pulse 115   Wt 106.1 kg (234 lb)   BMI 32.76 kg/m      Physical Exam        Nasal Endoscopy -     The patient was counseled that their symptoms and history require a direct visualization with an endoscopy procedure.  They understood and we proceeded with a fiberoptic examination.  First I sprayed both sides of the nose with a mixture of lidocaine and oxymetazline.  I then passed the scope through the RIGHT nasal cavity.  Color photographs were taken for the permanent medical record.  The interior of the nasal cavity shows a polyp inferiorly,  the middle and superior meatus, the turbinates, and the sphenoethmoid recess was unremarkable.  The nasopharynx  was mucosally covered and symmetric. The Eustachian tube openings were unobstructed.    Patient tolerated well.    Polyp in inferior nasal passage       Lateral attachment of polyp        Middle meatus

## 2024-08-16 ENCOUNTER — OFFICE VISIT (OUTPATIENT)
Dept: OTOLARYNGOLOGY | Facility: CLINIC | Age: 79
End: 2024-08-16
Payer: COMMERCIAL

## 2024-08-16 VITALS
WEIGHT: 234 LBS | BODY MASS INDEX: 32.76 KG/M2 | DIASTOLIC BLOOD PRESSURE: 92 MMHG | HEART RATE: 115 BPM | SYSTOLIC BLOOD PRESSURE: 161 MMHG

## 2024-08-16 DIAGNOSIS — R19.6 HALITOSIS: ICD-10-CM

## 2024-08-16 DIAGNOSIS — J33.9 NASAL POLYPOSIS: Primary | ICD-10-CM

## 2024-08-16 PROCEDURE — 99213 OFFICE O/P EST LOW 20 MIN: CPT | Mod: 25 | Performed by: PHYSICIAN ASSISTANT

## 2024-08-16 PROCEDURE — 31231 NASAL ENDOSCOPY DX: CPT | Performed by: PHYSICIAN ASSISTANT

## 2024-08-16 RX ORDER — CHLORHEXIDINE GLUCONATE ORAL RINSE 1.2 MG/ML
15 SOLUTION DENTAL 2 TIMES DAILY
Qty: 300 ML | Refills: 0 | Status: SHIPPED | OUTPATIENT
Start: 2024-08-16 | End: 2024-08-26

## 2024-08-16 NOTE — LETTER
8/16/2024      Neville Bonilla  [address] [place] [address]      Dear Colleague,    Thank you for referring your patient, Neville Bonilla, to the [facility]. Please see a copy of my visit note below.    Assessment & Plan    Sinus symptoms generally significantly improved with astelin though halitosis persists,  given location of residual polyp may still be obstructing maxillary canal to some extent, will trial as below and await allergy consult,   6M follow-up     Problem List Items Addressed This Visit    None  Visit Diagnoses       Nasal polyposis    -  Primary    Relevant Orders    NASAL ENDOSCOPY, DIAGNOSTIC (Completed)    Halitosis        Relevant Medications    chlorhexidine (PERIDEX) 0.12 % solution                21 minutes spent on the date of the encounter doing chart review, history and exam, documentation and further activities per the note  {     MELISSA Hanson  [facility]    Subjective     HPI-  6 week follow-up for  chronic nasal obstruction, chronic rhinitis, halitosis. Started on astelin.   CT results   showed significant sinus blockages on the right and probable polyps on the RT , LEFT septal deviation posteriorly, tx'd with prednisone and allergy referral placed (appt in November).  Didn't tolerate the prednisone- caused dizziness but nonetheless - right side is feeling much better- less pain, headaches, and easier to breathe. .            Review of Systems   ENT as above      Objective    BP (!) 161/92   Pulse 115   Wt 106.1 kg (234 lb)   BMI 32.76 kg/m      Physical Exam        Nasal Endoscopy -     The patient was counseled that their symptoms and history require a direct visualization with an endoscopy procedure.  They understood and we proceeded with a fiberoptic examination.  First I sprayed both sides of the nose with a mixture of lidocaine and oxymetazline.  I then passed the scope through the RIGHT nasal cavity.   Color photographs were taken for the permanent medical record.  The interior of the nasal cavity shows a polyp inferiorly,  the middle and superior meatus, the turbinates, and the sphenoethmoid recess was unremarkable.  The nasopharynx was mucosally covered and symmetric. The Eustachian tube openings were unobstructed.    Patient tolerated well.    Polyp in inferior nasal passage       Lateral attachment of polyp        Middle meatus        Again, thank you for allowing me to participate in the care of your patient.        Sincerely,        MELISSA Hanson

## 2024-12-16 ENCOUNTER — TELEPHONE (OUTPATIENT)
Dept: OTOLARYNGOLOGY | Facility: CLINIC | Age: 79
End: 2024-12-16
Payer: MEDICARE

## 2024-12-16 NOTE — TELEPHONE ENCOUNTER
Writer called and spoke with pt to return pt's phone call to schedule 6 month follow-up appointment with MELISSA Nielson for nasal polyp recheck. Pt states the area where his polyp is located has been swollen and irritated for ~1 week now and is having associated eye drainage with this. Writer able to schedule pt to see MELISSA Nielson tomorrow, 12/17/2024, at 9:30 am at the [facility]. Pt and spouse agreeable with appointment date/time/location.     Carina Hernandez RN on 12/16/2024 at 3:28 PM

## 2024-12-16 NOTE — TELEPHONE ENCOUNTER
[facility] Call Center    Phone Message    May a detailed message be left on voicemail: yes     Reason for Call: Other: Pt calling to schedule return rhinology for follow up on sinus. Writer unable to pull up template for provider. Please call. Thanks.       Action Taken: Other: ENT    Travel Screening: Not Applicable     Date of Service:

## 2024-12-17 ENCOUNTER — MEDICAL CORRESPONDENCE (OUTPATIENT)
Dept: HEALTH INFORMATION MANAGEMENT | Facility: CLINIC | Age: 79
End: 2024-12-17
Payer: MEDICARE

## 2024-12-17 ENCOUNTER — TELEPHONE (OUTPATIENT)
Dept: OTOLARYNGOLOGY | Facility: CLINIC | Age: 79
End: 2024-12-17
Payer: MEDICARE

## 2024-12-17 ENCOUNTER — OFFICE VISIT (OUTPATIENT)
Dept: OTOLARYNGOLOGY | Facility: CLINIC | Age: 79
End: 2024-12-17
Payer: COMMERCIAL

## 2024-12-17 DIAGNOSIS — R26.81 UNSTEADINESS: ICD-10-CM

## 2024-12-17 DIAGNOSIS — H57.89 EYE DRAINAGE: ICD-10-CM

## 2024-12-17 DIAGNOSIS — J33.9 NASAL POLYPOSIS: Primary | ICD-10-CM

## 2024-12-17 DIAGNOSIS — L29.9 EAR ITCHING: ICD-10-CM

## 2024-12-17 PROCEDURE — 99214 OFFICE O/P EST MOD 30 MIN: CPT | Performed by: PHYSICIAN ASSISTANT

## 2024-12-17 RX ORDER — OFLOXACIN 3 MG/ML
1 SOLUTION/ DROPS OPHTHALMIC EVERY 4 HOURS
Qty: 3 ML | Refills: 0 | Status: SHIPPED | OUTPATIENT
Start: 2024-12-17 | End: 2024-12-24

## 2024-12-17 RX ORDER — AMLODIPINE BESYLATE 5 MG/1
1 TABLET ORAL DAILY
COMMUNITY
Start: 2024-08-22

## 2024-12-17 NOTE — PROGRESS NOTES
Assessment & Plan     Benign eye exam today.  Runny nose and eye drainage and itching could all be associated with allergies.  Advise starting allergy eyedrops and following with primary care.  As needed ofloxacin if symptoms worsen. Restart astelin, advised to contact allergist office to see if they can get on the wait list.Patient declined medication for ear itching at this time.    Patient unsteadiness does not at this point appear to be primarily vestibular.  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  We discussed going back to see physical therapy but he would rather review his prior paperwork and start doing those exercises more consistently.    FOLLOW-UP May.        Problem List Items Addressed This Visit    None  Visit Diagnoses       Nasal polyposis    -  Primary    Unsteadiness        Eye drainage        Relevant Medications    ofloxacin (OCUFLOX) 0.3 % ophthalmic solution    Ear itching                  30 minutes spent on the date of the encounter doing chart review, history and exam, documentation and further activities per the note  {     MELISSA Hanson  [facility]    Subjective     HPI-f/u nasal polyp - 1 week of swelling at polyp site with assocaited eye drainage     Last Visit:  8/16 with me.   Assessment & Plan  Sinus symptoms generally significantly improved with astelin though halitosis persists,  given location of residual polyp may still be obstructing maxillary canal to some extent, will trial as below and await allergy consult,   6M follow-up      Interim Hx:  November allergy appt rescheduled for April.      Today, reports a week or so of right crusting, especially in the mornings, some green exudates, some right cheek redness (yesterday) and swelling, chronic rhinitis returned after he stopped the astelin about a month ago.  3 months ago was given meds for pink eye - didn't  reuse.  Does sometimes right ear feels itchy.   [facility]      Had been having chronic unsteadiness- worse at night.  In April of this year had episode of severe vertigo, right sided hearing loss,  vomiting, fell to the floor, was observed all day,   the unsteadiness started then.  No longer gets vertigo.  Did see vestibular p/t x3 in the past.  He does some of the exercises at home.  Still has the paperwork.          Review of Systems   ENT as above      Objective    There were no vitals taken for this visit.    Physical Exam        EYE- clear, no drainage no surrounding erythema, no obvious cheek swelling, EOMI, PERRLA     Neuro: Alert and oriented - normal speech. Cranial nerves 2-12 intact.  Normal strength. Using extremities freely.     Gait and Station Normal.  + en bloc turning.  Romberg Negative.  The patient was able to stand independently in the exam room without support or assistance.  Head impulse test normal.

## 2024-12-17 NOTE — LETTER
12/17/2024      Neville Bonilla  [address] [place] [address]      Dear Colleague,    Thank you for referring your patient, eNville Bonilla, to the [facility]. Please see a copy of my visit note below.    Assessment & Plan    Benign eye exam today.  Runny nose and eye drainage and itching could all be associated with allergies.  Advise starting allergy eyedrops and following with primary care.  As needed ofloxacin if symptoms worsen. Restart astelin, advised to contact allergist office to see if they can get on the wait list.Patient declined medication for ear itching at this time.    Patient unsteadiness does not at this point appear to be primarily vestibular.  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  We discussed going back to see physical therapy but he would rather review his prior paperwork and start doing those exercises more consistently.    FOLLOW-UP May.        Problem List Items Addressed This Visit    None  Visit Diagnoses       Nasal polyposis    -  Primary    Unsteadiness        Eye drainage        Relevant Medications    ofloxacin (OCUFLOX) 0.3 % ophthalmic solution    Ear itching                  30 minutes spent on the date of the encounter doing chart review, history and exam, documentation and further activities per the note  {     MELISSA Hanson  [facility]    Subjective     HPI-f/u nasal polyp - 1 week of swelling at polyp site with assocaited eye drainage     Last Visit:  8/16 with me.   Assessment & Plan  Sinus symptoms generally significantly improved with astelin though halitosis persists,  given location of residual polyp may still be obstructing maxillary canal to some extent, will trial as below and await allergy consult,   6M follow-up      Interim Hx:  November allergy appt rescheduled for April.      Today, reports a week or so of  right crusting, especially in the mornings, some green exudates, some right cheek redness (yesterday) and swelling, chronic rhinitis returned after he stopped the astelin about a month ago.  3 months ago was given meds for pink eye - didn't reuse.  Does sometimes right ear feels itchy.   [facility]      Had been having chronic unsteadiness- worse at night.  In April of this year had episode of severe vertigo, right sided hearing loss,  vomiting, fell to the floor, was observed all day,   the unsteadiness started then.  No longer gets vertigo.  Did see vestibular p/t x3 in the past.  He does some of the exercises at home.  Still has the paperwork.          Review of Systems   ENT as above      Objective    There were no vitals taken for this visit.    Physical Exam        EYE- clear, no drainage no surrounding erythema, no obvious cheek swelling, EOMI, PERRLA     Neuro: Alert and oriented - normal speech. Cranial nerves 2-12 intact.  Normal strength. Using extremities freely.     Gait and Station Normal.  + en bloc turning.  Romberg Negative.  The patient was able to stand independently in the exam room without support or assistance.  Head impulse test normal.             Again, thank you for allowing me to participate in the care of your patient.        Sincerely,        MELISSA Hanson

## 2024-12-17 NOTE — TELEPHONE ENCOUNTER
Spoke with Neville and his wife. RSF Form (request for services) [facility]. He will call back if he has any other questions.   Guadalupe So RN on 12/17/2024 at 1:03 PM

## 2024-12-17 NOTE — PATIENT INSTRUCTIONS
Restart astelin nasal spray    Try over the counter antihistamine drops (eg.ketotifen) and artificial tears and if not improving or significant eye discharge occurs, start the antibiotic drops.    Pinkeye: Care Instructions  Overview     Pinkeye is redness and swelling of the eye surface and the conjunctiva (the lining of the eyelid and the covering of the white part of the eye). Pinkeye is also called conjunctivitis. Pinkeye is often caused by infection with bacteria or a virus. Dry air, allergies, smoke, and chemicals are other common causes.  Pinkeye often gets better on its own in 7 to 10 days. Antibiotics only help if the pinkeye is caused by bacteria. Pinkeye caused by infection spreads easily. If an allergy or chemical is causing pinkeye, it will not go away unless you can avoid whatever is causing it.  Follow-up care is a key part of your treatment and safety. Be sure to make and go to all appointments, and call your doctor if you are having problems. It's also a good idea to know your test results and keep a list of the medicines you take.  How can you care for yourself at home?  Wash your hands often. Always wash them before and after you treat pinkeye or touch your eyes or face.  Use moist cotton or a clean, wet cloth to remove crust. Wipe from the inside corner of the eye to the outside. Use a clean part of the cloth for each wipe.  Put cold or warm wet cloths on your eye a few times a day if the eye hurts.  Do not wear contact lenses or eye makeup until the pinkeye is gone. Throw away any eye makeup you were using when you got pinkeye. Clean your contacts and storage case. If you wear disposable contacts, use a new pair when your eye has cleared and it is safe to wear contacts again.  If the doctor gave you antibiotic ointment or eyedrops, use them as directed. Use the medicine for as long as instructed, even if your eye starts looking better soon. Keep the bottle tip clean, and do not let it touch the  "eye area.  To put in eyedrops or ointment:  Tilt your head back, and pull your lower eyelid down with one finger.  Drop or squirt the medicine inside the lower lid.  Close your eye for 30 to 60 seconds to let the drops or ointment move around.  Do not touch the ointment or dropper tip to your eyelashes or any other surface.  Do not share towels, pillows, or washcloths while you have pinkeye.  When should you call for help?   Call your doctor now or seek immediate medical care if:    You have pain in your eye, not just irritation on the surface.     You have a change in vision or loss of vision.     You have an increase in discharge from the eye.     Your eye has not started to improve or begins to get worse within 48 hours after you start using antibiotics.     Pinkeye lasts longer than 7 days.   Watch closely for changes in your health, and be sure to contact your doctor if you have any problems.  Where can you learn more?  Go to https://www.[employer].net/patiented  Enter Y392 in the search box to learn more about \"Pinkeye: Care Instructions.\"  Current as of: October 12, 2022               Content Version: 13.7    8136-6965 [employer].   Care instructions adapted under license by your healthcare professional. If you have questions about a medical condition or this instruction, always ask your healthcare professional. [employer] disclaims any warranty or liability for your use of this information.          "

## 2024-12-17 NOTE — TELEPHONE ENCOUNTER
[facility] Call Center    Phone Message    May a detailed message be left on voicemail: yes     Reason for Call: Form or Letter   Type or form/letter needing completion: Request for services for year 2025  Provider: Juan Jay PA   Date form needed: 12/23/24  Once completed: Fax form to: [phone].  [facility] He stated this form should be in [facility] that was [facility] to [facility]. Please call pt when the form is completed and faxed. Did confirm phone number, fax number and insurance. Thanks   Action Taken: Message routed to:  Other: ENT    Travel Screening: Not Applicable     Date of Service:

## 2025-04-17 ENCOUNTER — OFFICE VISIT (OUTPATIENT)
Dept: ALLERGY | Facility: CLINIC | Age: 80
End: 2025-04-17
Payer: COMMERCIAL

## 2025-04-17 VITALS — DIASTOLIC BLOOD PRESSURE: 76 MMHG | HEART RATE: 66 BPM | SYSTOLIC BLOOD PRESSURE: 144 MMHG | OXYGEN SATURATION: 96 %

## 2025-04-17 DIAGNOSIS — J31.0 NONALLERGIC RHINITIS: Primary | ICD-10-CM

## 2025-04-17 NOTE — PROGRESS NOTES
Neville Bonilla was seen in the Allergy Clinic at [facility].    Neville Bonilla is a 80 year old White male being seen today at the request of MELISSA Rivera in consultation for allergies. Accompanied today by his wife.    Has a lot of rhinorrhea and post-nasal drainage, frequent headaches, sinus pressure. Reports symptoms have been ongoing since childhood - present throughout the year without seasonal variation. Recently prescribed azelastine nasal spray and feels this is helping. Has not found antihistamines to be helpful in the past. Has had sinus infections in the past - treated about once per year with antibiotics. Symptoms would only temporary improve with this treatment. Sense of smell varies - can be good and sometimes will be very diminished.      Past Medical History:   Diagnosis Date    Cellulitis 1996    Recurrent/RT leg    Clavicle fracture 1991    Grade III AC separation    Diverticulosis of colon     Hyperlipidemia LDL goal < 130 10/1997    Lumbar disc herniation 9/1992    Obesity     Renal stone     Sensorineural hearing loss, unilateral 1997     Sleep apnea     Uses CPAP    Uric acid retention 8/16/2001     Family History   Problem Relation Age of Onset    Heart Disease Mother     Diabetes Father     Asthma Brother     Thyroid Disease Sister      Past Surgical History:   Procedure Laterality Date     KNEE SCOPE,MED/LAT MENISECTOMY  10/23/13    Right, with partial medial ONLY, chondoplasty     NASAL SURG PROC UNLISTED  1978    ROTATOR CUFF REPAIR RT/LT  1/29/1999    Left    TONSILLECTOMY & ADENOIDECTOMY  Age 13    URETEROSTOMY  8/2001       ENVIRONMENTAL HISTORY:   [place]eville [place] The home is heated with a forced air. They do have central air conditioning. The patient's bedroom is furnished with carpeting in bedroom.  Pets inside the house include 1 dog(s). There is no history of cockroach or mice infestation. Do you smoke cigarettes  or other recreational drugs? No Do you vape or use an e-cigarette? No. There is/are 0 smokers living in the house. There is/are 0 who smoke ecigarettes/vape living in the house. The house does not have a damp basement.     SOCIAL HISTORY:   Neivlle is retired. He lives with his spouse.        Current Outpatient Medications:     amLODIPine (NORVASC) 5 MG tablet, Take 1 tablet by mouth daily., Disp: , Rfl:     atorvastatin (LIPITOR) 20 MG tablet, , Disp: , Rfl:     azelastine (ASTELIN) 0.1 % nasal spray, Spray 1 spray into both nostrils 2 times daily, Disp: 30 mL, Rfl: 11    empagliflozin (JARDIANCE) 25 MG TABS tablet, Take 12.5 mg by mouth, Disp: , Rfl:     losartan (COZAAR) 100 MG tablet, , Disp: , Rfl:     Multiple Vitamin (MULTI-VITAMIN) per tablet, Take 1 tablet by mouth daily., Disp: , Rfl:     aspirin 325 MG tablet, Take by mouth at onset of headache (Patient not taking: Reported on 4/17/2025), Disp: , Rfl:     metFORMIN (GLUCOPHAGE XR) 500 MG 24 hr tablet, 1,000 mg (Patient not taking: Reported on 4/17/2025), Disp: , Rfl:     predniSONE (DELTASONE) 10 MG tablet, Take 1 tablet (10 mg) by mouth 2 times daily ( At breakfast and lunch with food) (Patient not taking: Reported on 4/17/2025), Disp: 20 tablet, Rfl: 0  Immunization History   Administered Date(s) Administered    COVID-19 12+ (Pfizer) 12/19/2023, 11/20/2024    COVID-19 Monovalent 18+ (Moderna) 03/10/2021    HepA, Unspecified 07/19/2017    Hepatitis A/B (Twinrix) 08/16/2017, 01/19/2018    Hepatitis B, Adult (Energix-B/Recombivax HB) 07/19/2017    Influenza (High Dose) Trivalent,PF (Fluzone) 02/07/2017, 10/25/2017, 10/21/2018, 10/01/2019    Influenza (prior to 2024) 11/28/2018    Influenza Vaccine >6 months,quad, PF 10/08/2020    Japanese Encephalitis IM 07/19/2017, 08/16/2017, 01/08/2020    Pneumo Conj 13-V (2010&after) 02/07/2017    Pneumococcal 23 valent 10/17/2014    TD,PF 7+ (Tenivac) 04/28/1995, 01/20/2004    TDAP Vaccine (Adacel) 08/10/2010    Tdap  (Adult) Unspecified Formulation 01/20/2004, 10/03/2012    Typhoid IM 07/19/2017, 01/08/2020    Zoster recombinant adjuvanted (Shingrix) 01/03/2020, 03/05/2020    Zoster vaccine, live 10/17/2014     Allergies   Allergen Reactions    Prednisone Dizziness    Lisinopril Cough    Metformin Diarrhea         EXAM:   BP (!) 144/76 (BP Location: Right arm, Patient Position: Sitting, Cuff Size: Adult Large)   Pulse 66   SpO2 96%   Physical Exam  Vitals and nursing note reviewed.   Constitutional:       Appearance: Normal appearance.   HENT:      Head: Normocephalic and atraumatic.      Right Ear: External ear normal.      Left Ear: External ear normal.      Nose: No mucosal edema, congestion or rhinorrhea.      Mouth/Throat:      Mouth: Mucous membranes are moist. No oral lesions.      Pharynx: Oropharynx is clear. Uvula midline. No posterior oropharyngeal erythema.   Eyes:      General: Lids are normal.      Extraocular Movements: Extraocular movements intact.      Conjunctiva/sclera: Conjunctivae normal.   Neck:      Comments: No asymmetry, masses, or scars  Cardiovascular:      Rate and Rhythm: Normal rate and regular rhythm.      Heart sounds: S1 normal and S2 normal. No murmur heard.  Pulmonary:      Effort: Pulmonary effort is normal. No respiratory distress.      Breath sounds: Normal breath sounds and air entry.   Musculoskeletal:      Comments: No musculoskeletal defects noted   Skin:     General: Skin is warm and dry.      Findings: No lesion or rash.   Neurological:      General: No focal deficit present.      Mental Status: He is alert.   Psychiatric:         Mood and Affect: Mood and affect normal.           WORKUP: Skin testing  ENVIRONMENTAL PERCUTANEOUS SKIN TESTING: ADULT      4/17/2025     7:00 AM   [facility]   Consent Y   Ordering Physician Dr. Holt   Interpreting Physician Dr. Holt   Testing Technician DHRUV Hernandez   Location Back   Time start: 07:38   Time End: 07:53   Positive Control:  Histatrol*ALK 1 mg/ml 8/10   Negative Control: 50% Glycerin 0   Cat Hair*ALK (10,000 BAU/ml) 0   AP Dog Hair/Dander (1:100 w/v) 0   Dust Mite p. 30,000 AU/ml 0   Dust Mite f. (30,000 AU/ml) 0   Luis (W/F in millimeters) 0   Deuce Grass (100,000 BAU/mL) 0   Red Cedar (W/F in millimeters) 0   Maple/[place] (W/F in millimeters) 0   Hackberry (W/F in millimeters) 0   [place] (W/F in millimeters) 0   Cherry *ALK (W/F in millimeters) 0   American Elm (W/F in millimeters) 0   [place] (W/F in millimeters) 0   Black [place] (W/F in millimeters) 0   Birch Mix (W/F in millimeters) 0   [place] (W/F in millimeters) 0   Oak (W/F in millimeters) 0   Cocklebur (W/F in millimeters) 0   [place] (W/F in millimeters) 0   White Florencio (W/F in millimeters) 0   Careless (W/F in millimeters) 0   Nettle (W/F in millimeters) 0   English Plantain (W/F in millimeters) 0   Kochia (W/F in millimeters) 0   Lamb's Quarter (W/F in millimeters) 0   Marshelder (W/F in millimeters) 0   Ragweed Mix* ALK (W/F in millimeters) 0   Russian Thistle (W/F in millimeters) 0   Sagebrush/Mugwort (W/F in millimeters) 0   Sheep Sorrel (W/F in millimeters) 0   Feather Mix* ALK (W/F in millimeters) 0   Penicillium Mix (1:10 w/v) 0   Curvularia spicifera (1:10 w/v) 0   Epicoccum (1:10 w/v) 0   Aspergillus fumigatus (1:10 w/v): 0   Alternaria tenius (1:10 w/v) 0   H. Cladosporium (1:10 w/v) 0   Phoma herbarum (1:10 w/v) 0      Appropriate response to controls, all others negative    ASSESSMENT/PLAN:  Neville Bonilla is a 80 year old male here for evaluation of allergies.    1. Nonallergic rhinitis (Primary) - Long-standing history of rhinosinusitis symptoms. No previous improvement with antihistamines. Has been using azelastine nasal spray and finds this beneficial. Skin testing today is negative for evidence of aeroallergen sensitization.     - continue azelastine nasal spray - 1 to 2 sprays in each nostril twice daily  - continue ongoing management per  ENT  - ALLERGY SKIN TESTS,ALLERGENS      Follow-up as needed      Thank you for allowing me to participate in the care of Neville Bonilla.      Seng Holt MD, FAAAAI  Allergy/Immunology  [facility]    Consent was obtained from the patient to use an AI documentation tool in the creation of this note.    Chart documentation done in part with Dragon Voice Recognition Software. Although reviewed after completion, some word and grammatical errors may remain.

## 2025-04-17 NOTE — LETTER
4/17/2025      Neville Bonilla  [address]  [place] [address]      Dear Colleague,    Thank you for referring your patient, Neville Bonilla, to the [facility]. Please see a copy of my visit note below.    Neville Bonilla was seen in the Allergy Clinic at [facility].    Neville Bonilla is a 80 year old White male being seen today at the request of MELISSA Rivera in consultation for allergies. Accompanied today by his wife.    Has a lot of rhinorrhea and post-nasal drainage, frequent headaches, sinus pressure. Reports symptoms have been ongoing since childhood - present throughout the year without seasonal variation. Recently prescribed azelastine nasal spray and feels this is helping. Has not found antihistamines to be helpful in the past. Has had sinus infections in the past - treated about once per year with antibiotics. Symptoms would only temporary improve with this treatment. Sense of smell varies - can be good and sometimes will be very diminished.      Past Medical History:   Diagnosis Date     Cellulitis 1996    Recurrent/RT leg     Clavicle fracture 1991    Grade III AC separation     Diverticulosis of colon      Hyperlipidemia LDL goal < 130 10/1997     Lumbar disc herniation 9/1992     Obesity      Renal stone      Sensorineural hearing loss, unilateral 1997      Sleep apnea     Uses CPAP     Uric acid retention 8/16/2001     Family History   Problem Relation Age of Onset     Heart Disease Mother      Diabetes Father      Asthma Brother      Thyroid Disease Sister      Past Surgical History:   Procedure Laterality Date      KNEE SCOPE,MED/LAT MENISECTOMY  10/23/13    Right, with partial medial ONLY, chondoplasty      NASAL SURG PROC UNLISTED  1978     ROTATOR CUFF REPAIR RT/LT  1/29/1999    Left     TONSILLECTOMY & ADENOIDECTOMY  Age 13     URETEROSTOMY  8/2001       ENVIRONMENTAL HISTORY:   Neville [place]  The home is heated with a forced air. They do have central air conditioning. The patient's bedroom is furnished with carpeting in bedroom.  Pets inside the house include 1 dog(s). There is no history of cockroach or mice infestation. Do you smoke cigarettes or other recreational drugs? No Do you vape or use an e-cigarette? No. There is/are 0 smokers living in the house. There is/are 0 who smoke ecigarettes/vape living in the house. The house does not have a damp basement.     SOCIAL HISTORY:   Neville is retired. He lives with his spouse.        Current Outpatient Medications:      amLODIPine (NORVASC) 5 MG tablet, Take 1 tablet by mouth daily., Disp: , Rfl:      atorvastatin (LIPITOR) 20 MG tablet, , Disp: , Rfl:      azelastine (ASTELIN) 0.1 % nasal spray, Spray 1 spray into both nostrils 2 times daily, Disp: 30 mL, Rfl: 11     empagliflozin (JARDIANCE) 25 MG TABS tablet, Take 12.5 mg by mouth, Disp: , Rfl:      losartan (COZAAR) 100 MG tablet, , Disp: , Rfl:      Multiple Vitamin (MULTI-VITAMIN) per tablet, Take 1 tablet by mouth daily., Disp: , Rfl:      aspirin 325 MG tablet, Take by mouth at onset of headache (Patient not taking: Reported on 4/17/2025), Disp: , Rfl:      metFORMIN (GLUCOPHAGE XR) 500 MG 24 hr tablet, 1,000 mg (Patient not taking: Reported on 4/17/2025), Disp: , Rfl:      predniSONE (DELTASONE) 10 MG tablet, Take 1 tablet (10 mg) by mouth 2 times daily ( At breakfast and lunch with food) (Patient not taking: Reported on 4/17/2025), Disp: 20 tablet, Rfl: 0  Immunization History   Administered Date(s) Administered     COVID-19 12+ (Pfizer) 12/19/2023, 11/20/2024     COVID-19 Monovalent 18+ (Moderna) 03/10/2021     HepA, Unspecified 07/19/2017     Hepatitis A/B (Twinrix) 08/16/2017, 01/19/2018     Hepatitis B, Adult (Energix-B/Recombivax HB) 07/19/2017     Influenza (High Dose) Trivalent,PF (Fluzone) 02/07/2017, 10/25/2017, 10/21/2018, 10/01/2019     Influenza (prior to 2024) 11/28/2018      Influenza Vaccine >6 months,quad, PF 10/08/2020     Japanese Encephalitis IM 07/19/2017, 08/16/2017, 01/08/2020     Pneumo Conj 13-V (2010&after) 02/07/2017     Pneumococcal 23 valent 10/17/2014     TD,PF 7+ (Tenivac) 04/28/1995, 01/20/2004     TDAP Vaccine (Adacel) 08/10/2010     Tdap (Adult) Unspecified Formulation 01/20/2004, 10/03/2012     Typhoid IM 07/19/2017, 01/08/2020     Zoster recombinant adjuvanted (Shingrix) 01/03/2020, 03/05/2020     Zoster vaccine, live 10/17/2014     Allergies   Allergen Reactions     Prednisone Dizziness     Lisinopril Cough     Metformin Diarrhea         EXAM:   BP (!) 144/76 (BP Location: Right arm, Patient Position: Sitting, Cuff Size: Adult Large)   Pulse 66   SpO2 96%   Physical Exam  Vitals and nursing note reviewed.   Constitutional:       Appearance: Normal appearance.   HENT:      Head: Normocephalic and atraumatic.      Right Ear: External ear normal.      Left Ear: External ear normal.      Nose: No mucosal edema, congestion or rhinorrhea.      Mouth/Throat:      Mouth: Mucous membranes are moist. No oral lesions.      Pharynx: Oropharynx is clear. Uvula midline. No posterior oropharyngeal erythema.   Eyes:      General: Lids are normal.      Extraocular Movements: Extraocular movements intact.      Conjunctiva/sclera: Conjunctivae normal.   Neck:      Comments: No asymmetry, masses, or scars  Cardiovascular:      Rate and Rhythm: Normal rate and regular rhythm.      Heart sounds: S1 normal and S2 normal. No murmur heard.  Pulmonary:      Effort: Pulmonary effort is normal. No respiratory distress.      Breath sounds: Normal breath sounds and air entry.   Musculoskeletal:      Comments: No musculoskeletal defects noted   Skin:     General: Skin is warm and dry.      Findings: No lesion or rash.   Neurological:      General: No focal deficit present.      Mental Status: He is alert.   Psychiatric:         Mood and Affect: Mood and affect normal.           WORKUP: Skin  testing  ENVIRONMENTAL PERCUTANEOUS SKIN TESTING: ADULT      4/17/2025     7:00 AM   [facility]   Consent Y   Ordering Physician Dr. Holt   Interpreting Physician Dr. Holt   Testing Technician DHRUV Hernandez   Location Back   Time start: 07:38   Time End: 07:53   Positive Control: Histatrol*ALK 1 mg/ml 8/10   Negative Control: 50% Glycerin 0   Cat Hair*ALK (10,000 BAU/ml) 0   AP Dog Hair/Dander (1:100 w/v) 0   Dust Mite p. 30,000 AU/ml 0   Dust Mite f. (30,000 AU/ml) 0   Luis (W/F in millimeters) 0   Deuce Grass (100,000 BAU/mL) 0   Red Cedar (W/F in millimeters) 0   Maple/[place] (W/F in millimeters) 0   Hackberry (W/F in millimeters) 0   [place] (W/F in millimeters) 0   [place] *ALK (W/F in millimeters) 0   American Elm (W/F in millimeters) 0   [place] (W/F in millimeters) 0   Black [place] (W/F in millimeters) 0   Birch Mix (W/F in millimeters) 0   [place] (W/F in millimeters) 0   Oak (W/F in millimeters) 0   [place] (W/F in millimeters) 0   [place] (W/F in millimeters) 0   White Florencio (W/F in millimeters) 0   Careless (W/F in millimeters) 0   Nettle (W/F in millimeters) 0   English Plantain (W/F in millimeters) 0   Kochia (W/F in millimeters) 0   Lamb's Quarter (W/F in millimeters) 0   Marshelder (W/F in millimeters) 0   Ragweed Mix* ALK (W/F in millimeters) 0   Russian Thistle (W/F in millimeters) 0   Sagebrush/Mugwort (W/F in millimeters) 0   Sheep Sorrel (W/F in millimeters) 0   Feather Mix* ALK (W/F in millimeters) 0   Penicillium Mix (1:10 w/v) 0   Curvularia spicifera (1:10 w/v) 0   Epicoccum (1:10 w/v) 0   Aspergillus fumigatus (1:10 w/v): 0   Alternaria tenius (1:10 w/v) 0   H. Cladosporium (1:10 w/v) 0   Phoma herbarum (1:10 w/v) 0      Appropriate response to controls, all others negative    ASSESSMENT/PLAN:  Neville Bonilla is a 80 year old male here for evaluation of allergies.    1. Nonallergic rhinitis (Primary) - Long-standing history of rhinosinusitis symptoms. No previous  improvement with antihistamines. Has been using azelastine nasal spray and finds this beneficial. Skin testing today is negative for evidence of aeroallergen sensitization.     - continue azelastine nasal spray - 1 to 2 sprays in each nostril twice daily  - continue ongoing management per ENT  - ALLERGY SKIN TESTS,ALLERGENS      Follow-up as needed      Thank you for allowing me to participate in the care of Neville Bonilla.      Seng Holt MD, [facility]  Allergy/Immunology  [facility]    Consent was obtained from the patient to use an AI documentation tool in the creation of this note.    Chart documentation done in part with Dragon Voice Recognition Software. Although reviewed after completion, some word and grammatical errors may remain.    Per provider verbal order, placed Adult Environmental Panel scratch test.  Consent was obtained prior to procedure.  Once panels were placed, patient was monitored for 15 minutes in clinic.  Provider read test after 15 minutes.  Pt tolerated procedure well.  All questions and concerns were addressed at office visit.     GAVIOTA Zarate, RN, PHN               Again, thank you for allowing me to participate in the care of your patient.        Sincerely,        Seng Holt MD    Electronically signed

## 2025-04-17 NOTE — PROGRESS NOTES
Per provider verbal order, placed Adult Environmental Panel scratch test.  Consent was obtained prior to procedure.  Once panels were placed, patient was monitored for 15 minutes in clinic.  Provider read test after 15 minutes.  Pt tolerated procedure well.  All questions and concerns were addressed at office visit.     KRYSTAL ZarateN, RN, PHN

## 2025-04-21 NOTE — PROGRESS NOTES
Assessment & Plan     Assessment & Plan  Nasal mass:  - Presence of a polypoid mass confirmed by examination and CTT scan.    - Biopsy recommended. Consideration for Functional Endoscopic Sinus Surgery (FESS) to remove the polyp .    Nasal obstruction:  - Obstruction primarily on the right side, likely due to the nasal mass.  - Consideration for FESS to open up the maxillary and sphenoid sinuses.    Chronic maxillary sinusitis:  - Sinus drainage and blockage noted, likely related to nasal mass.  - Consideration for FESS. Trial of Flonase nasal spray in the meantime.    Chronic rhinitis:     General unsteadiness:  - Unsteadiness possibly due to multiple factors including past inner ear problems and musculoskeletal issues.  -   refer to a balance therapist        Problem List Items Addressed This Visit          Endocrine    Type 2 diabetes mellitus without complication (H)     Other Visit Diagnoses         Nasal obstruction    -  Primary    Relevant Medications    fluticasone (FLONASE) 50 MCG/ACT nasal spray    Other Relevant Orders    Case Request: FUNCTIONAL ENDOSCOPIC SINUS SURGERY, WITH BALLOON SINUPLASTY, WITH IMAGING GUIDANCE, RT maxillary, RT sphenoid, possibly left maxillary, POLYPECTOMY, NASAL CAVITY, ENDOSCOPIC (Completed)      Nasal mass        Relevant Orders    Case Request: FUNCTIONAL ENDOSCOPIC SINUS SURGERY, WITH BALLOON SINUPLASTY, WITH IMAGING GUIDANCE, RT maxillary, RT sphenoid, possibly left maxillary, POLYPECTOMY, NASAL CAVITY, ENDOSCOPIC (Completed)      Chronic maxillary sinusitis        Relevant Medications    fluticasone (FLONASE) 50 MCG/ACT nasal spray    Other Relevant Orders    Case Request: FUNCTIONAL ENDOSCOPIC SINUS SURGERY, WITH BALLOON SINUPLASTY, WITH IMAGING GUIDANCE, RT maxillary, RT sphenoid, possibly left maxillary, POLYPECTOMY, NASAL CAVITY, ENDOSCOPIC (Completed)      Chronic rhinitis        Relevant Medications    fluticasone (FLONASE) 50 MCG/ACT nasal spray      General  unsteadiness        Relevant Orders    Physical Therapy  Referral      Halitosis        Relevant Orders    Case Request: FUNCTIONAL ENDOSCOPIC SINUS SURGERY, WITH BALLOON SINUPLASTY, WITH IMAGING GUIDANCE, RT maxillary, RT sphenoid, possibly left maxillary, POLYPECTOMY, NASAL CAVITY, ENDOSCOPIC (Completed)             Review of external notes as documented elsewhere in note    20 minutes spent on the date of the encounter doing chart review, history and exam, documentation and further activities per the note  {     MELISSA Hanson  [facility]    Subjective     HPI- Needs repeat RIGHT nasal scope for inferior cavity nasal mass.     Last Visit w/ me:  Assessment & Plan  Benign eye exam today.  Runny nose and eye drainage and itching could all be associated with allergies.  Advise starting allergy eyedrops and following with primary care.  As needed ofloxacin if symptoms worsen. Restart astelin, advised to contact allergist office to see if they can get on the wait list.Patient declined medication for ear itching at this time.     Patient unsteadiness does not at this point appear to be primarily vestibular.  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  We discussed going back to see physical therapy but he would rather review his prior paperwork and start doing those exercises more consistently.     FOLLOW-UP May.      Had a nasal scope in August appointment had a look like an inferior right-sided obstructive  polyp.  It was read as a polyp on the CT last summer as well.     Interim Hx:  Allergy 4/17- 1. Nonallergic rhinitis (Primary) - Long-standing history of rhinosinusitis symptoms. No previous improvement with antihistamines. Has been using azelastine nasal spray and finds this beneficial. Skin testing today is negative for evidence of aeroallergen sensitization.         History of Present  Illness-  Sinus issues  Experiencing persistent sinus drainage and sinus headaches. Reports obstruction primarily on the right side, with occasional obstruction on the left side that resolves quickly.  Gets halitosis as well.  Flonase nasal spray has been used in the past.    Balance and dizziness  History of a severe episode of vertigo, which led to chronic balance issues. T  Exercises have been attempted to improve balance.  Is amenable to vestibular therapy referral today    Misc:  [facility]  .      Review of Systems   ENT as above      Objective    There were no vitals taken for this visit.    Physical Exam     NOSE:     Dorsum:   straight  Caudal Septum: straight  Septum:  midline  Mucosa:  moist     Nasal Endoscopy -     The patient was counseled that their symptoms and history require a direct visualization with an endoscopy procedure.  They understood and we proceeded with a fiberoptic examination.  First I sprayed both sides of the nose with a mixture of lidocaine and oxymetazline.  I then passed the scope through the nasal cavity.  Color photographs were taken for the permanent medical record.   There is a large obstructive mass from the maxillary area on the right side into the inferior nasal passage   patient tolerated well.    Right anterior inferior meatus

## 2025-05-07 ENCOUNTER — TELEPHONE (OUTPATIENT)
Dept: FAMILY MEDICINE | Facility: CLINIC | Age: 80
End: 2025-05-07
Payer: COMMERCIAL

## 2025-05-07 NOTE — LETTER
May 21, 2025    Neville Bonilla    [address] [place] [address]    Hello,     Your care team at [facility] values your health and well-being. After reviewing your chart, we have identified recommendation(s) to help you better manage your health.    It's time for your Medicare AWV. During your visit, we'll discuss your health, well-being, and any questions you may have related to preventive care. We'll also review any recommended tests, exams, or screenings you might need. To schedule please call your clinic [phone] or use your [facility] account.    If you recently had or are having any of these services soon, please contact the clinic via phone or [facility] so that your care team can update your records.    We look forward to seeing you at your upcoming visit.    If you have any questions or concerns, please contact our clinic. Thank you for continuing to trust us with your care.    Sincerely,    Your [facility] Care Team            Electronically signed

## 2025-05-07 NOTE — TELEPHONE ENCOUNTER
Patient Quality Outreach    Patient is due for the following:   Diabetes -  A1C, LDL (Fasting), eGFR, Eye Exam, Microalbumin, and Foot Exam  Physical Annual Wellness Visit  There are no preventive care reminders to display for this patient.    Action(s) Taken:   Schedule a Annual Wellness Visit    Type of outreach:    Phone, left message for patient/parent to call back.    Questions for provider review:    None         Janet Smith MA  Chart routed to None.

## 2025-05-08 ENCOUNTER — TELEPHONE (OUTPATIENT)
Dept: OTOLARYNGOLOGY | Facility: CLINIC | Age: 80
End: 2025-05-08
Payer: COMMERCIAL

## 2025-05-08 ENCOUNTER — OFFICE VISIT (OUTPATIENT)
Dept: OTOLARYNGOLOGY | Facility: CLINIC | Age: 80
End: 2025-05-08
Payer: COMMERCIAL

## 2025-05-08 DIAGNOSIS — J31.0 CHRONIC RHINITIS: ICD-10-CM

## 2025-05-08 DIAGNOSIS — J32.0 CHRONIC MAXILLARY SINUSITIS: ICD-10-CM

## 2025-05-08 DIAGNOSIS — R26.81 GENERAL UNSTEADINESS: ICD-10-CM

## 2025-05-08 DIAGNOSIS — R19.6 HALITOSIS: ICD-10-CM

## 2025-05-08 DIAGNOSIS — E11.9 TYPE 2 DIABETES MELLITUS WITHOUT COMPLICATION, WITHOUT LONG-TERM CURRENT USE OF INSULIN (H): ICD-10-CM

## 2025-05-08 DIAGNOSIS — J34.89 NASAL MASS: ICD-10-CM

## 2025-05-08 DIAGNOSIS — J34.89 NASAL OBSTRUCTION: Primary | ICD-10-CM

## 2025-05-08 RX ORDER — FLUTICASONE PROPIONATE 50 MCG
1 SPRAY, SUSPENSION (ML) NASAL DAILY
Qty: 16 G | Refills: 5 | Status: SHIPPED | OUTPATIENT
Start: 2025-05-08

## 2025-05-08 RX ORDER — SILDENAFIL 50 MG/1
50 TABLET, FILM COATED ORAL
COMMUNITY
Start: 2024-08-22

## 2025-05-08 NOTE — LETTER
Pre-op Physical: Schedule a pre-op physical with your primary care doctor. The pre-op physical must be 10-30 days before surgery and since it is required by anesthesia, your surgery will be cancelled if it's not done.      Surgery Date: 7/24/2025     Location: [place] [address]    Approximate Arrival Time: 9:00 am  (Unless instructed differently by the pre-op call nurse)     Post op Appointment: 7/30/2025 at  1:20 pm  [facility] [address] [place] [address] [address], [place] [address].    Pre-Surgical Tasks:     Review all medications with your primary care or prescribing physician; they will advise you which meds to stop and when, and when you can resume taking.  Certain medications like blood thinners and weight loss medications need to be stopped in advance of surgery to proceed safely.      Blood thinners including but not exclusive to drugs like Xarelto, Eliquis, Warfarin and Aspirin, should be stopped five days before surgery, if your prescribing provider agrees. Follow your provider's advice on stopping blood thinners because they know you best.  If you are unsure if your medication is a blood thinner, ask your prescribing provider.    Weight loss medications: There are multiple medications being used for weight management and diabetes today, and the list is growing.  Phentermine, Ozempic, Wegovy, Trulicity, and other similar medications need to be stopped one week before surgery to avoid being cancelled.  Victoza and Saxenda can be continued longer but must be stopped one full day before surgery.  Please ask your prescribing provider for advice.    Diabetic medications: in addition to the medications talked about above that are used for either weight loss or diabetes, some people are on insulin that may require adjustment.  Please discuss managing diabetic medications with your prescribing doctor  as these medications may require modification prior to surgery.     Please shower the evening before and morning of surgery with Hibiclens soap.  Any [facility] can provide this to you at no cost, or it can be found at your local pharmacy.     Fasting instructions will be provided by the pre-op nurse who will call you 1-3 days before surgery.  Typically, we advise normal food up to 8 hours before you arrive for surgery. Clear liquids only from then until 2 hours before you arrive surgery, then nothing at all by mouth.  The nurse will review your specific instructions with you at the call.      Smoking impacts your body's ability to heal properly so we advise patients to quit if possible before surgery.  Plastic Surgery patients are required to be nicotine free for at least 8 weeks before surgery.      You will need an adult to drive you home and stay with you 24 hours after surgery. Public transportation or Medical Van Services are not permitted.    Visitor restrictions are subject to change, please verify with the pre-op nurse when they call how many people are permitted to accompany you.    We always encourage you to notify your insurance any time you have medical tests or procedures scheduled including surgery. The number is usually right on the back of your insurance card. To obtain pricing for surgery, please call [facility] Cost of Care at [phone] or email [email or web address].        Call our office if you have any questions! Thank you!     Kacey Archibald MA  [facility]   (General Surgery/ ENT/ Plastics)  Direct Office: [phone]          Electronically signed

## 2025-05-08 NOTE — LETTER
5/8/2025      Neville Bonilla  [address] [place] [address]      Dear Colleague,    Thank you for referring your patient, Neville Bonilla, to the [facility]. Please see a copy of my visit note below.    Assessment & Plan    Assessment & Plan  Nasal mass:  - Presence of a polypoid mass confirmed by examination and CTT scan.    - Biopsy recommended. Consideration for Functional Endoscopic Sinus Surgery (FESS) to remove the polyp .    Nasal obstruction:  - Obstruction primarily on the right side, likely due to the nasal mass.  - Consideration for FESS to open up the maxillary and sphenoid sinuses.    Chronic maxillary sinusitis:  - Sinus drainage and blockage noted, likely related to nasal mass.  - Consideration for FESS. Trial of Flonase nasal spray in the meantime.    Chronic rhinitis:     General unsteadiness:  - Unsteadiness possibly due to multiple factors including past inner ear problems and musculoskeletal issues.  -   refer to a balance therapist        Problem List Items Addressed This Visit          Endocrine    Type 2 diabetes mellitus without complication (H)     Other Visit Diagnoses         Nasal obstruction    -  Primary    Relevant Medications    fluticasone (FLONASE) 50 MCG/ACT nasal spray    Other Relevant Orders    Case Request: FUNCTIONAL ENDOSCOPIC SINUS SURGERY, WITH BALLOON SINUPLASTY, WITH IMAGING GUIDANCE, RT maxillary, RT sphenoid, possibly left maxillary, POLYPECTOMY, NASAL CAVITY, ENDOSCOPIC (Completed)      Nasal mass        Relevant Orders    Case Request: FUNCTIONAL ENDOSCOPIC SINUS SURGERY, WITH BALLOON SINUPLASTY, WITH IMAGING GUIDANCE, RT maxillary, RT sphenoid, possibly left maxillary, POLYPECTOMY, NASAL CAVITY, ENDOSCOPIC (Completed)      Chronic maxillary sinusitis        Relevant Medications    fluticasone (FLONASE) 50 MCG/ACT nasal spray    Other Relevant Orders    Case Request: FUNCTIONAL ENDOSCOPIC SINUS SURGERY, WITH BALLOON SINUPLASTY, WITH  IMAGING GUIDANCE, RT maxillary, RT sphenoid, possibly left maxillary, POLYPECTOMY, NASAL CAVITY, ENDOSCOPIC (Completed)      Chronic rhinitis        Relevant Medications    fluticasone (FLONASE) 50 MCG/ACT nasal spray      General unsteadiness        Relevant Orders    Physical Therapy  Referral      Halitosis        Relevant Orders    Case Request: FUNCTIONAL ENDOSCOPIC SINUS SURGERY, WITH BALLOON SINUPLASTY, WITH IMAGING GUIDANCE, RT maxillary, RT sphenoid, possibly left maxillary, POLYPECTOMY, NASAL CAVITY, ENDOSCOPIC (Completed)             Review of external notes as documented elsewhere in note    20 minutes spent on the date of the encounter doing chart review, history and exam, documentation and further activities per the note  {     MELISSA Hanson  [facility]    Subjective     HPI- Needs repeat RIGHT nasal scope for inferior cavity nasal mass.     Last Visit w/ me:  Assessment & Plan  Benign eye exam today.  Runny nose and eye drainage and itching could all be associated with allergies.  Advise starting allergy eyedrops and following with primary care.  As needed ofloxacin if symptoms worsen. Restart astelin, advised to contact allergist office to see if they can get on the wait list.Patient declined medication for ear itching at this time.     Patient unsteadiness does not at this point appear to be primarily vestibular.  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  We discussed going back to see physical therapy but he would rather review his prior paperwork and start doing those exercises more consistently.     FOLLOW-UP May.      Had a nasal scope in August appointment had a look like an inferior right-sided obstructive  polyp.  It was read as a polyp on the CT last summer as well.     Interim Hx:  Allergy 4/17- 1. Nonallergic rhinitis (Primary) - Long-standing history of  rhinosinusitis symptoms. No previous improvement with antihistamines. Has been using azelastine nasal spray and finds this beneficial. Skin testing today is negative for evidence of aeroallergen sensitization.         History of Present Illness-  Sinus issues  Experiencing persistent sinus drainage and sinus headaches. Reports obstruction primarily on the right side, with occasional obstruction on the left side that resolves quickly.  Gets halitosis as well.  Flonase nasal spray has been used in the past.    Balance and dizziness  History of a severe episode of vertigo, which led to chronic balance issues. T  Exercises have been attempted to improve balance.  Is amenable to vestibular therapy referral today    Misc:  [facility],  .      Review of Systems   ENT as above      Objective    There were no vitals taken for this visit.    Physical Exam     NOSE:     Dorsum:   straight  Caudal Septum: straight  Septum:  midline  Mucosa:  moist     Nasal Endoscopy -     The patient was counseled that their symptoms and history require a direct visualization with an endoscopy procedure.  They understood and we proceeded with a fiberoptic examination.  First I sprayed both sides of the nose with a mixture of lidocaine and oxymetazline.  I then passed the scope through the nasal cavity.  Color photographs were taken for the permanent medical record.   There is a large obstructive mass from the maxillary area on the right side into the inferior nasal passage   patient tolerated well.    Right anterior inferior meatus            Again, thank you for allowing me to participate in the care of your patient.        Sincerely,        MELISSA Hanson    Electronically signed

## 2025-05-08 NOTE — TELEPHONE ENCOUNTER
Spoke with patient today to schedule surgery as ordered by the provider.    WC/MVA Related?: No    Went over details/instructions as written on the letter.    Pre Op By: H&P by Primary MD - [facility]  Post Op Scheduled : YES    Medications:  Blood Thinners? No  Weight Loss Meds? No  Diabetes Meds? No    Surgery Letter sent via Mail  Is this the correct address?: Yes  [address] [place] [address]      (Please see LETTERS TAB in chart to retrieve a copy of this letter)

## 2025-05-08 NOTE — NURSING NOTE
Sino-Nasal Outcome Test (SNOT - 22)    1. Need to Blow Nose: (Proxy-Rptd) (P) Severe  2. Nasal Blockage: (Proxy-Rptd) (P) Severe  3. Sneezing: (Proxy-Rptd) (P) Moderate  4. Runny Nose: (Proxy-Rptd) (P) Severe  5. Cough: (Proxy-Rptd) (P) Moderate  6. Post-nasal discharge: (Proxy-Rptd) (P) Bad as it can be  7. Thick nasal discharge: (Proxy-Rptd) (P) Severe  8. Ear fullness: (Proxy-Rptd) (P) Severe  9. Dizziness: (Proxy-Rptd) (P) Bad as it can be  10. Ear Pain: (Proxy-Rptd) (P) Bad as it can be  11. Facial pain/pressure: (Proxy-Rptd) (P) Bad as it can be  12. Decreased Sense of Smell/Taste: (Proxy-Rptd) (P) Mild or slight  13. Difficulty falling asleep: (Proxy-Rptd) (P) Moderate  14. Wake up at night: (Proxy-Rptd) (P) Moderate  15. Lack of a good night's sleep: (Proxy-Rptd) (P) Severe  16. Wake up tired: (Proxy-Rptd) (P) Severe  17. Fatigue: (Proxy-Rptd) (P) Severe  18. Reduced Productivity: (Proxy-Rptd) (P) Moderate  19. Reduced Concentration: (Proxy-Rptd) (P) Severe  20. Frustrated/restless/irritable: (Proxy-Rptd) (P) Severe  21. Sad: (Proxy-Rptd) (P) None  22. Embarrassed: (Proxy-Rptd) (P) Mild or slight    Total Score: (Proxy-Rptd) (P) 79    COPYRIGHT 1996. [facility]

## 2025-05-13 ENCOUNTER — TELEPHONE (OUTPATIENT)
Dept: OTOLARYNGOLOGY | Facility: CLINIC | Age: 80
End: 2025-05-13
Payer: COMMERCIAL

## 2025-05-13 ENCOUNTER — MEDICAL CORRESPONDENCE (OUTPATIENT)
Dept: HEALTH INFORMATION MANAGEMENT | Facility: CLINIC | Age: 80
End: 2025-05-13

## 2025-05-13 NOTE — TELEPHONE ENCOUNTER
[facility] Call Center    Phone Message    May a detailed message be left on voicemail: yes     Reason for Call: Other: Per pt they are [facility] for the [facility]. Please call to discuss, Thank you     Action Taken: Message routed to:  [facility] ENT    Travel Screening: Not Applicable     Date of Service:

## 2025-05-13 NOTE — TELEPHONE ENCOUNTER
Left message for pt to call back, wondering what info is needed/what I can help with?  Guadalupe So RN on 5/13/2025 at 12:24 PM

## 2025-05-14 NOTE — TELEPHONE ENCOUNTER
Spoke with pt/wife. They are requesting [facility] form [id number] to request additional ENT visits. Form was filled out and faxed.   Guadalupe So RN on 5/14/2025 at 1:56 PM

## 2025-05-15 ENCOUNTER — TELEPHONE (OUTPATIENT)
Dept: OTOLARYNGOLOGY | Facility: CLINIC | Age: 80
End: 2025-05-15
Payer: COMMERCIAL

## 2025-05-15 NOTE — TELEPHONE ENCOUNTER
Re-faxed to number provided incase they haven't received it yet.   Guadalupe So RN on 5/15/2025 at 3:02 PM

## 2025-05-15 NOTE — TELEPHONE ENCOUNTER
[facility] Call Center    Phone Message    May a detailed message be left on voicemail: yes     Reason for Call: Other: Neville wanted to make sure that Guadalupe Kaplan got the correct fax number for the [facility]. I see that you already faxed it, but patient wanted to make sure. [phone]. If you already have this please disregard the message. Thank you.      Action Taken: Message routed to:  Other: ENT    Travel Screening: Not Applicable     Date of Service:

## 2025-05-27 ENCOUNTER — TELEPHONE (OUTPATIENT)
Dept: OTOLARYNGOLOGY | Facility: CLINIC | Age: 80
End: 2025-05-27
Payer: COMMERCIAL

## 2025-05-27 NOTE — TELEPHONE ENCOUNTER
[facility] Call Center    Phone Message    May a detailed message be left on voicemail: yes     Reason for Call: Other: Pt returned call to Guadalupe Kaplan, please call pt again, [place]ilah , ricardo     Action Taken: Other: ENT    Travel Screening: Not Applicable     Date of Service:

## 2025-06-04 ENCOUNTER — TRANSCRIBE ORDERS (OUTPATIENT)
Dept: OTHER | Age: 80
End: 2025-06-04

## 2025-06-04 ENCOUNTER — TRANSFERRED RECORDS (OUTPATIENT)
Dept: HEALTH INFORMATION MANAGEMENT | Facility: CLINIC | Age: 80
End: 2025-06-04
Payer: COMMERCIAL

## 2025-06-04 DIAGNOSIS — J34.89 NASAL OBSTRUCTION: Primary | ICD-10-CM

## 2025-06-04 DIAGNOSIS — J34.89 NASAL MASS: ICD-10-CM

## 2025-06-05 ENCOUNTER — PATIENT OUTREACH (OUTPATIENT)
Dept: CARE COORDINATION | Facility: CLINIC | Age: 80
End: 2025-06-05
Payer: COMMERCIAL

## 2025-06-09 ENCOUNTER — PATIENT OUTREACH (OUTPATIENT)
Dept: CARE COORDINATION | Facility: CLINIC | Age: 80
End: 2025-06-09
Payer: COMMERCIAL

## 2025-06-26 ENCOUNTER — TELEPHONE (OUTPATIENT)
Dept: OTOLARYNGOLOGY | Facility: CLINIC | Age: 80
End: 2025-06-26
Payer: COMMERCIAL

## 2025-06-26 NOTE — TELEPHONE ENCOUNTER
[facility] Call Center    Phone Message    May a detailed message be left on voicemail: yes     Reason for Call: Other: Patient is requesting to speak with Guadalupe Kaplan. He has some questions regarding his upcoming surgery with provider. Please call to advise. Did confirm phone number. Thanks!     Please call patient at [phone].    Action Taken: Message routed to:  Other: ENT    Travel Screening: Not Applicable

## 2025-06-26 NOTE — TELEPHONE ENCOUNTER
Pt calling to confirm that we've received the PA approval from the [facility] for his upcoming FESS. I sent a message to the PA department to confirm. Pt will call back if he has any other questions or concerns. Also provided him with the fax # for the [facility] to fax his preop.     Guadalupe So RN on 6/26/2025 at 2:05 PM

## 2025-07-02 LAB
ALBUMIN (URINE) MG/SPEC: 6.7 MG/L
ALBUMIN/CREATININE RATIO: 8.2 MG/G{CREAT}
CREATININE (URINE): 82 MG/DL (ref 58–161)

## 2025-07-10 ENCOUNTER — TELEPHONE (OUTPATIENT)
Dept: OTOLARYNGOLOGY | Facility: CLINIC | Age: 80
End: 2025-07-10
Payer: COMMERCIAL

## 2025-07-10 NOTE — TELEPHONE ENCOUNTER
[facility] Call Center    Phone Message    May a detailed message be left on voicemail: yes     Reason for Call: Other: Pt is wondering if clinic has received Pre[facility] for [facility], and if pre surgery packet can be sent out to him. Please call pt back to discuss     Action Taken: Other: [facility] ENT    Travel Screening: Not Applicable     Date of Service:

## 2025-07-10 NOTE — TELEPHONE ENCOUNTER
Spoke to pt, went over preop instructions and advised him that the preop nurse would call him a few days before his surgery to confirm arrival time and instructions. Also mailed a copy of the letter from 5/8 per pt's request. He has no other questions.  Guadalupe So RN on 7/10/2025 at 4:33 PM

## 2025-07-21 ENCOUNTER — TRANSFERRED RECORDS (OUTPATIENT)
Dept: MULTI SPECIALTY CLINIC | Facility: CLINIC | Age: 80
End: 2025-07-21
Payer: COMMERCIAL

## 2025-07-21 LAB
CREATININE (EXTERNAL): 1.2 MG/DL (ref 0.7–1.2)
GLUCOSE (EXTERNAL): 177 MG/DL (ref 70–100)
HBA1C MFR BLD: 7.1 % (ref 4–6)
INR (EXTERNAL): 1.2 (ref 0.8–1.1)
POTASSIUM (EXTERNAL): 3.7 MMOL/L (ref 3.5–5.1)

## 2025-07-22 ENCOUNTER — TELEPHONE (OUTPATIENT)
Dept: OTOLARYNGOLOGY | Facility: CLINIC | Age: 80
End: 2025-07-22
Payer: COMMERCIAL

## 2025-07-22 ENCOUNTER — HOSPITAL ENCOUNTER (OUTPATIENT)
Dept: CT IMAGING | Facility: HOSPITAL | Age: 80
Discharge: HOME | End: 2025-07-22
Attending: PHYSICIAN ASSISTANT
Payer: COMMERCIAL

## 2025-07-22 DIAGNOSIS — J32.0 CHRONIC MAXILLARY SINUSITIS: ICD-10-CM

## 2025-07-22 DIAGNOSIS — J34.89 NASAL MASS: ICD-10-CM

## 2025-07-22 DIAGNOSIS — J31.0 CHRONIC RHINITIS: ICD-10-CM

## 2025-07-22 DIAGNOSIS — J34.89 NASAL OBSTRUCTION: ICD-10-CM

## 2025-07-22 DIAGNOSIS — J34.89 NASAL MASS: Primary | ICD-10-CM

## 2025-07-22 PROCEDURE — 70486 CT MAXILLOFACIAL W/O DYE: CPT

## 2025-07-22 NOTE — TELEPHONE ENCOUNTER
[facility] Call Center    Phone Message    May a detailed message be left on voicemail: yes     Reason for Call: Other: Pt would like a call back from Guadalupe Kaplan  [place] jigna, thanks     Action Taken: Other: ENT    Travel Screening: Not Applicable     Date of Service:

## 2025-07-23 RX ORDER — CARBOXYMETHYLCELLULOSE SODIUM 5 MG/ML
2 SOLUTION/ DROPS OPHTHALMIC
COMMUNITY

## 2025-07-24 ENCOUNTER — ANESTHESIA (OUTPATIENT)
Dept: SURGERY | Facility: HOSPITAL | Age: 80
End: 2025-07-24
Payer: COMMERCIAL

## 2025-07-24 ENCOUNTER — HOSPITAL ENCOUNTER (OUTPATIENT)
Facility: HOSPITAL | Age: 80
Discharge: HOME | End: 2025-07-24
Attending: OTOLARYNGOLOGY | Admitting: OTOLARYNGOLOGY
Payer: COMMERCIAL

## 2025-07-24 ENCOUNTER — ANESTHESIA EVENT (OUTPATIENT)
Dept: SURGERY | Facility: HOSPITAL | Age: 80
End: 2025-07-24
Payer: COMMERCIAL

## 2025-07-24 ENCOUNTER — TELEPHONE (OUTPATIENT)
Dept: OTOLARYNGOLOGY | Facility: CLINIC | Age: 80
End: 2025-07-24
Payer: COMMERCIAL

## 2025-07-24 VITALS
RESPIRATION RATE: 16 BRPM | OXYGEN SATURATION: 93 % | WEIGHT: 237.4 LBS | BODY MASS INDEX: 33.24 KG/M2 | SYSTOLIC BLOOD PRESSURE: 188 MMHG | TEMPERATURE: 97 F | HEART RATE: 74 BPM | DIASTOLIC BLOOD PRESSURE: 80 MMHG

## 2025-07-24 DIAGNOSIS — Z98.890 S/P FESS (FUNCTIONAL ENDOSCOPIC SINUS SURGERY): Primary | ICD-10-CM

## 2025-07-24 LAB
BACTERIA SPEC CULT: ABNORMAL
BACTERIA SPEC CULT: NORMAL
GLUCOSE BLDC GLUCOMTR-MCNC: 174 MG/DL (ref 70–99)
GLUCOSE BLDC GLUCOMTR-MCNC: 232 MG/DL (ref 70–99)
GRAM STAIN RESULT: ABNORMAL
GRAM STAIN RESULT: ABNORMAL
GRAM STAIN RESULT: NORMAL
GRAM STAIN RESULT: NORMAL
PATH REPORT.COMMENTS IMP SPEC: NORMAL
PATH REPORT.INTRAOP OBS SPEC DOC: NORMAL

## 2025-07-24 PROCEDURE — 710N000009 HC RECOVERY PHASE 1, LEVEL 1, PER MIN: Performed by: OTOLARYNGOLOGY

## 2025-07-24 PROCEDURE — 258N000003 HC RX IP 258 OP 636: Performed by: ANESTHESIOLOGY

## 2025-07-24 PROCEDURE — 31295 NSL/SINS NDSC SURG MAX SINS: CPT | Mod: 50 | Performed by: OTOLARYNGOLOGY

## 2025-07-24 PROCEDURE — C1726 CATH, BAL DIL, NON-VASCULAR: HCPCS | Performed by: OTOLARYNGOLOGY

## 2025-07-24 PROCEDURE — 360N000076 HC SURGERY LEVEL 3, PER MIN: Performed by: OTOLARYNGOLOGY

## 2025-07-24 PROCEDURE — 250N000009 HC RX 250: Performed by: NURSE ANESTHETIST, CERTIFIED REGISTERED

## 2025-07-24 PROCEDURE — 710N000012 HC RECOVERY PHASE 2, PER MINUTE: Performed by: OTOLARYNGOLOGY

## 2025-07-24 PROCEDURE — 87205 SMEAR GRAM STAIN: CPT | Performed by: OTOLARYNGOLOGY

## 2025-07-24 PROCEDURE — 370N000017 HC ANESTHESIA TECHNICAL FEE, PER MIN: Performed by: OTOLARYNGOLOGY

## 2025-07-24 PROCEDURE — 250N000011 HC RX IP 250 OP 636: Performed by: NURSE ANESTHETIST, CERTIFIED REGISTERED

## 2025-07-24 PROCEDURE — 30999 UNLISTED PROCEDURE NOSE: CPT | Performed by: OTOLARYNGOLOGY

## 2025-07-24 PROCEDURE — 87075 CULTR BACTERIA EXCEPT BLOOD: CPT | Performed by: OTOLARYNGOLOGY

## 2025-07-24 PROCEDURE — 87102 FUNGUS ISOLATION CULTURE: CPT | Performed by: OTOLARYNGOLOGY

## 2025-07-24 PROCEDURE — 999N000141 HC STATISTIC PRE-PROCEDURE NURSING ASSESSMENT: Performed by: OTOLARYNGOLOGY

## 2025-07-24 PROCEDURE — 61782 SCAN PROC CRANIAL EXTRA: CPT | Performed by: OTOLARYNGOLOGY

## 2025-07-24 PROCEDURE — 250N000025 HC SEVOFLURANE, PER MIN: Performed by: OTOLARYNGOLOGY

## 2025-07-24 PROCEDURE — 88304 TISSUE EXAM BY PATHOLOGIST: CPT | Mod: TC | Performed by: OTOLARYNGOLOGY

## 2025-07-24 PROCEDURE — 250N000011 HC RX IP 250 OP 636: Performed by: OTOLARYNGOLOGY

## 2025-07-24 PROCEDURE — 87070 CULTURE OTHR SPECIMN AEROBIC: CPT | Performed by: OTOLARYNGOLOGY

## 2025-07-24 PROCEDURE — 272N000002 HC OR SUPPLY OTHER OPNP: Performed by: OTOLARYNGOLOGY

## 2025-07-24 PROCEDURE — 258N000001 HC RX 258: Performed by: OTOLARYNGOLOGY

## 2025-07-24 PROCEDURE — 82962 GLUCOSE BLOOD TEST: CPT

## 2025-07-24 PROCEDURE — 250N000009 HC RX 250: Performed by: OTOLARYNGOLOGY

## 2025-07-24 PROCEDURE — 272N000001 HC OR GENERAL SUPPLY STERILE: Performed by: OTOLARYNGOLOGY

## 2025-07-24 RX ORDER — ONDANSETRON 2 MG/ML
4 INJECTION INTRAMUSCULAR; INTRAVENOUS EVERY 30 MIN PRN
Status: DISCONTINUED | OUTPATIENT
Start: 2025-07-24 | End: 2025-07-24 | Stop reason: HOSPADM

## 2025-07-24 RX ORDER — HYDROMORPHONE HCL IN WATER/PF 6 MG/30 ML
0.4 PATIENT CONTROLLED ANALGESIA SYRINGE INTRAVENOUS EVERY 5 MIN PRN
Status: DISCONTINUED | OUTPATIENT
Start: 2025-07-24 | End: 2025-07-24 | Stop reason: HOSPADM

## 2025-07-24 RX ORDER — LIDOCAINE HYDROCHLORIDE AND EPINEPHRINE 10; 10 MG/ML; UG/ML
INJECTION, SOLUTION INFILTRATION; PERINEURAL PRN
Status: DISCONTINUED | OUTPATIENT
Start: 2025-07-24 | End: 2025-07-24 | Stop reason: HOSPADM

## 2025-07-24 RX ORDER — ONDANSETRON 2 MG/ML
INJECTION INTRAMUSCULAR; INTRAVENOUS PRN
Status: DISCONTINUED | OUTPATIENT
Start: 2025-07-24 | End: 2025-07-24

## 2025-07-24 RX ORDER — OXYCODONE AND ACETAMINOPHEN 5; 325 MG/1; MG/1
1 TABLET ORAL EVERY 6 HOURS PRN
Qty: 12 TABLET | Refills: 0 | Status: SHIPPED | OUTPATIENT
Start: 2025-07-24 | End: 2025-07-27

## 2025-07-24 RX ORDER — NALOXONE HYDROCHLORIDE 0.4 MG/ML
0.1 INJECTION, SOLUTION INTRAMUSCULAR; INTRAVENOUS; SUBCUTANEOUS
Status: DISCONTINUED | OUTPATIENT
Start: 2025-07-24 | End: 2025-07-24 | Stop reason: HOSPADM

## 2025-07-24 RX ORDER — SODIUM CHLORIDE, SODIUM LACTATE, POTASSIUM CHLORIDE, CALCIUM CHLORIDE 600; 310; 30; 20 MG/100ML; MG/100ML; MG/100ML; MG/100ML
INJECTION, SOLUTION INTRAVENOUS CONTINUOUS
Status: DISCONTINUED | OUTPATIENT
Start: 2025-07-24 | End: 2025-07-24 | Stop reason: HOSPADM

## 2025-07-24 RX ORDER — OXYCODONE HYDROCHLORIDE 5 MG/1
5 TABLET ORAL
Status: DISCONTINUED | OUTPATIENT
Start: 2025-07-24 | End: 2025-07-24 | Stop reason: HOSPADM

## 2025-07-24 RX ORDER — FENTANYL CITRATE 50 UG/ML
INJECTION, SOLUTION INTRAMUSCULAR; INTRAVENOUS PRN
Status: DISCONTINUED | OUTPATIENT
Start: 2025-07-24 | End: 2025-07-24

## 2025-07-24 RX ORDER — DEXAMETHASONE SODIUM PHOSPHATE 10 MG/ML
10 INJECTION, SOLUTION INTRAMUSCULAR; INTRAVENOUS ONCE
Status: COMPLETED | OUTPATIENT
Start: 2025-07-24 | End: 2025-07-24

## 2025-07-24 RX ORDER — FENTANYL CITRATE 50 UG/ML
25 INJECTION, SOLUTION INTRAMUSCULAR; INTRAVENOUS EVERY 5 MIN PRN
Status: DISCONTINUED | OUTPATIENT
Start: 2025-07-24 | End: 2025-07-24 | Stop reason: HOSPADM

## 2025-07-24 RX ORDER — FENTANYL CITRATE 50 UG/ML
50 INJECTION, SOLUTION INTRAMUSCULAR; INTRAVENOUS EVERY 5 MIN PRN
Status: DISCONTINUED | OUTPATIENT
Start: 2025-07-24 | End: 2025-07-24 | Stop reason: HOSPADM

## 2025-07-24 RX ORDER — ONDANSETRON 4 MG/1
4 TABLET, ORALLY DISINTEGRATING ORAL EVERY 30 MIN PRN
Status: DISCONTINUED | OUTPATIENT
Start: 2025-07-24 | End: 2025-07-24 | Stop reason: HOSPADM

## 2025-07-24 RX ORDER — HYDROMORPHONE HCL IN WATER/PF 6 MG/30 ML
0.2 PATIENT CONTROLLED ANALGESIA SYRINGE INTRAVENOUS EVERY 5 MIN PRN
Status: DISCONTINUED | OUTPATIENT
Start: 2025-07-24 | End: 2025-07-24 | Stop reason: HOSPADM

## 2025-07-24 RX ORDER — LIDOCAINE 40 MG/G
CREAM TOPICAL
Status: DISCONTINUED | OUTPATIENT
Start: 2025-07-24 | End: 2025-07-24 | Stop reason: HOSPADM

## 2025-07-24 RX ORDER — PROPOFOL 10 MG/ML
INJECTION, EMULSION INTRAVENOUS PRN
Status: DISCONTINUED | OUTPATIENT
Start: 2025-07-24 | End: 2025-07-24

## 2025-07-24 RX ORDER — DEXAMETHASONE SODIUM PHOSPHATE 4 MG/ML
4 INJECTION, SOLUTION INTRA-ARTICULAR; INTRALESIONAL; INTRAMUSCULAR; INTRAVENOUS; SOFT TISSUE
Status: DISCONTINUED | OUTPATIENT
Start: 2025-07-24 | End: 2025-07-24 | Stop reason: HOSPADM

## 2025-07-24 RX ORDER — OXYCODONE AND ACETAMINOPHEN 5; 325 MG/1; MG/1
1 TABLET ORAL
Status: DISCONTINUED | OUTPATIENT
Start: 2025-07-24 | End: 2025-07-24 | Stop reason: HOSPADM

## 2025-07-24 RX ORDER — OXYMETAZOLINE HYDROCHLORIDE 0.05 G/100ML
SPRAY NASAL PRN
Status: DISCONTINUED | OUTPATIENT
Start: 2025-07-24 | End: 2025-07-24 | Stop reason: HOSPADM

## 2025-07-24 RX ORDER — OXYCODONE HYDROCHLORIDE 5 MG/1
10 TABLET ORAL
Status: DISCONTINUED | OUTPATIENT
Start: 2025-07-24 | End: 2025-07-24 | Stop reason: HOSPADM

## 2025-07-24 RX ORDER — DEXAMETHASONE SODIUM PHOSPHATE 10 MG/ML
4 INJECTION, SOLUTION INTRAMUSCULAR; INTRAVENOUS
Status: DISCONTINUED | OUTPATIENT
Start: 2025-07-24 | End: 2025-07-24 | Stop reason: HOSPADM

## 2025-07-24 RX ORDER — OXYMETAZOLINE HYDROCHLORIDE 0.05 G/100ML
2 SPRAY NASAL ONCE
Status: COMPLETED | OUTPATIENT
Start: 2025-07-24 | End: 2025-07-24

## 2025-07-24 RX ORDER — LIDOCAINE HYDROCHLORIDE 10 MG/ML
INJECTION, SOLUTION INFILTRATION; PERINEURAL PRN
Status: DISCONTINUED | OUTPATIENT
Start: 2025-07-24 | End: 2025-07-24

## 2025-07-24 RX ADMIN — FENTANYL CITRATE 100 MCG: 50 INJECTION, SOLUTION INTRAMUSCULAR; INTRAVENOUS at 09:51

## 2025-07-24 RX ADMIN — PROPOFOL 40 MG: 10 INJECTION, EMULSION INTRAVENOUS at 10:33

## 2025-07-24 RX ADMIN — OXYMETAZOLINE HYDROCHLORIDE 2 SPRAY: 0.5 SPRAY NASAL at 09:16

## 2025-07-24 RX ADMIN — LIDOCAINE HYDROCHLORIDE 3 ML: 10 INJECTION, SOLUTION INFILTRATION; PERINEURAL at 09:51

## 2025-07-24 RX ADMIN — DEXAMETHASONE SODIUM PHOSPHATE 10 MG: 10 INJECTION, SOLUTION INTRAMUSCULAR; INTRAVENOUS at 09:56

## 2025-07-24 RX ADMIN — PROPOFOL 200 MG: 10 INJECTION, EMULSION INTRAVENOUS at 09:51

## 2025-07-24 RX ADMIN — FENTANYL CITRATE 100 MCG: 50 INJECTION, SOLUTION INTRAMUSCULAR; INTRAVENOUS at 10:33

## 2025-07-24 RX ADMIN — SUGAMMADEX 200 MG: 100 INJECTION, SOLUTION INTRAVENOUS at 11:59

## 2025-07-24 RX ADMIN — ROCURONIUM 20 MG: 50 INJECTION, SOLUTION INTRAVENOUS at 10:10

## 2025-07-24 RX ADMIN — ROCURONIUM 60 MG: 50 INJECTION, SOLUTION INTRAVENOUS at 09:51

## 2025-07-24 RX ADMIN — ONDANSETRON 4 MG: 2 INJECTION INTRAMUSCULAR; INTRAVENOUS at 10:47

## 2025-07-24 RX ADMIN — SODIUM CHLORIDE, SODIUM LACTATE, POTASSIUM CHLORIDE, AND CALCIUM CHLORIDE: .6; .31; .03; .02 INJECTION, SOLUTION INTRAVENOUS at 09:03

## 2025-07-24 ASSESSMENT — ACTIVITIES OF DAILY LIVING (ADL)
ADLS_ACUITY_SCORE: 18
ADLS_ACUITY_SCORE: 38
ADLS_ACUITY_SCORE: 18

## 2025-07-24 NOTE — INTERVAL H&P NOTE
I have reviewed the surgical (or preoperative) H&P that is linked to this encounter, and examined the patient. There are no significant changes    Clinical Conditions Present on Arrival:  Clinically Significant Risk Factors Present on Admission                 # Coagulation Defect: INR = 1.2 (Ref range: 0.8 - 1.1) and/or PTT = N/A, will monitor for bleeding

## 2025-07-24 NOTE — ANESTHESIA CARE TRANSFER NOTE
Patient: Neville Bonilla    Procedure: Procedure(s):  FUNCTIONAL ENDOSCOPIC SINUS SURGERY, WITH BALLOON SINUPLASTY, WITH IMAGING GUIDANCE, RIGHT and LEFT maxillary, RIGHT sphenoid, NASAL SEPTOPLASTY with removal of septal cartiage  POLYPECTOMY, NASAL CAVITY, ENDOSCOPIC       Diagnosis: Nasal mass [J34.89]  Nasal obstruction [J34.89]  Chronic maxillary sinusitis [J32.0]  Halitosis [R19.6]  Diagnosis Additional Information: No value filed.    Anesthesia Type:   General     Note:    Oropharynx: oropharynx clear of all foreign objects  Level of Consciousness: awake  Oxygen Supplementation: nasal cannula  Level of Supplemental Oxygen (L/min / FiO2): 8 L  Independent Airway: airway patency satisfactory and stable  Dentition: dentition unchanged  Vital Signs Stable: post-procedure vital signs reviewed and stable  Report to RN Given: handoff report given  Patient transferred to: PACU    Handoff Report: Identifed the Patient, Identified the Reponsible Provider, Reviewed the pertinent medical history, Discussed the surgical course, Reviewed Intra-OP anesthesia mangement and issues during anesthesia, Set expectations for post-procedure period and Allowed opportunity for questions and acknowledgement of understanding    Vitals:  Vitals Value Taken Time   /97 07/24/25 12:12   Temp 36    Pulse 68 07/24/25 12:14   Resp 14    SpO2 98 % 07/24/25 12:14   Vitals shown include unfiled device data.    Electronically Signed By: OMER Bryan CRNA  July 24, 2025  12:16 PM

## 2025-07-24 NOTE — OP NOTE
Otolaryngology Operative Note    Date of Operation:  07/24/25     Pre-operative Diagnosis:  1. Chronic Sinusitis  2. Deviated Nasal Septum; 3. Nasal Polyps   Post-operative Diagnosis:  same  Procedure(s):  1. Endoscopic sinus surgery (balloon dilation of bilateral  maxillary and RIGHT sphenoid sinuses)  2.  Nasal Septoplasty with removal of septal cartilage 3. Nasal Polypectomy  4.  Use of Image Guidance    Surgeon:  Patrice Lopez MD  Assistant(s):  None  Anesthesia:  General     Indications for Procedure:  The risks and the benefits of the procedure were discussed with the patient. A consent form was then signed and placed into the chart.    Procedure in Detail:  The patient was brought into the operating room and placed on the table in a supine position. Anesthesia intubated without any difficulties. A time out was performed with all pertinent personnel in the room. The bed was then rotated 90 degrees.     The nasal septum is injected with 1.5 ml of 1% Lidocaine with Epinephrine at 1:1000,000.   Afrin soaked nasal pledgets were then placed bilaterally   Image Guidance system is registered and checked for accuracy.      After 5 minutes, the pledgets are removed.        Both nasal cavities were photo documented.  Began the right-hand side the right nasal polyp was photo documented.  It was emanating from the infundibular space on the right.  It was fairly pedunculated and transected at the base with a through biter.  The remaining stump of the polyp was then taken down with the Tricut shaver.  The polyp was passed off the field for permanent pathology.  Next the right maxillary sinus was cannulated using a Sienna wire and dilated up to 12 chandu for total of 30 seconds.      Attention then turned to the left nasal cavity.  The septal balloon was placed along the floor and dilated to 8 chandu which helped reduce the posterior septal spur.  This is more anterior inflation was then accomplished to 4 chandu for 15 seconds.  Once  this is accomplished the left maxillary sinus was cannulated using a Sienna wire and dilated to 12 chandu for 30 seconds.    Attention then turned to the right side.  The sphenoethmoid recess was examined.  There was a circumferential polyp surrounding the and ablated in blocking the sphenoid ostia.  The sphenoid sinus was cannulated using a Sienna wire and irrigated using saline.  Some thick yellow pieces of inspissated debris were flushed out and sent for culture.  The polyp was a patient portion of the polyp was sent for frozen section which did not show any malignancy.  The remainder of the polyp was taken down using a image guided Quad cut 3.4 mm blade.     PosiSep dressing was then placed into the right sphenoethmoid recess as well as the left posterior nasal cavity for bleeding control.    The patient tolerated well without incident.    Patient tolerated the procedure well without incident he will return to otolaryngology clinic in 1 week for a postop visit.    The patient tolerated the procedure well without incident.     Findings: #1 right sided nasal polyp emanating from infundibular space #2 left sphenoethmoid recess blocking the sphenoid sinus.  #3 chronic sphenoid sinusitis with inspissated debris in the sphenoid sinus #4 deviated nasal septum with septal spur posteriorly      EBL:  15 mL    Specimens 1. right nasal polyp (infundibular)  and right sphenoid sphenoethmoid polyp sent for permanent section    Complications:  none    Disposition: The patient was extubated in the operating room and was transferred to the PACU in stable condition.       Patrice Lopez MD

## 2025-07-24 NOTE — TELEPHONE ENCOUNTER
Nothing in chart. I sent a message to Dr. Lopez.   I called [facility] to see if Dr. Lopez was still at the hospital and she was not sure.  Guadalupe So RN on 7/24/2025 at 2:00 PM

## 2025-07-24 NOTE — TELEPHONE ENCOUNTER
Per Dr. Lopez he will send something in electronically. I left a message for pt's wife that something will be sent in. Encouraged to call back with questions otherwise will plan to see pt on 7/30.  Guadalupe So RN on 7/24/2025 at 3:33 PM

## 2025-07-24 NOTE — DISCHARGE INSTRUCTIONS
Start nasal saline spray later today  Avoid nose blowing until your first post operative visit  Sleep with head elevated on several pillows for the first 3 nights to help with swelling  Follow up appointment as scheduled    Bring operative photos to your first post operative appointment for review

## 2025-07-24 NOTE — OR NURSING
Called the surgeon re: pain medication for home and replied that he will send a prescription to the pharmacy

## 2025-07-24 NOTE — ANESTHESIA PROCEDURE NOTES
Airway       Patient location during procedure: OR       Procedure Start/Stop Times: 7/24/2025 9:55 AM  Staff -        Anesthesiologist:  Alex Sinclair MD       Resident/Fellow: Joy Morillo       CRNA: Shaq Dumont APRN CRNA       Performed By: MICHELLE  Consent for Airway        Urgency: elective  Indications and Patient Condition       Indications for airway management: samantha-procedural       Induction type:intravenous       Mask difficulty assessment: 2 - vent by mask + OA or adjuvant +/- NMBA    Final Airway Details       Final airway type: endotracheal airway       Successful airway: ETT - single  Endotracheal Airway Details        ETT size (mm): 7.5       Cuffed: yes       Cuff volume (mL): 8       Successful intubation technique: direct laryngoscopy       DL Blade Type: Arthur 2       Grade View of Cords: 1       Adjucts: stylet       Position: Left       Measured from: gums/teeth       Secured at (cm): 21       Bite block used: None    Post intubation assessment        Placement verified by: capnometry, equal breath sounds and chest rise        Number of attempts at approach: 1       Number of other approaches attempted: 0       Secured with: tape       Ease of procedure: easy       Dentition: Intact and Unchanged       Dental guard used and removed. Dental Guard Type: Standard White.    Medication(s) Administered   Medication Administration Time: 7/24/2025 9:55 AM

## 2025-07-24 NOTE — TELEPHONE ENCOUNTER
[facility] Call Center    Phone Message    May a detailed message be left on voicemail: yes     Reason for Call: Other: Please call Post Op nurse Elsa to see if pt should have pain med prescrip. [phone].  [place] location thanks      Action Taken: Other: ENT    Travel Screening: Not Applicable     Date of Service:

## 2025-07-24 NOTE — ANESTHESIA PREPROCEDURE EVALUATION
Anesthesia Pre-Procedure Evaluation    Patient: Neville Bonilla   MRN: [id number] : 1945          Procedure : Procedure(s):  FUNCTIONAL ENDOSCOPIC SINUS SURGERY, WITH BALLOON SINUPLASTY, WITH IMAGING GUIDANCE, RIGHT maxillary, RIGHT sphenoid, possibly left maxillary  POLYPECTOMY, NASAL CAVITY, ENDOSCOPIC         Past Medical History:   Diagnosis Date    Ataxic gait     Cellulitis 1996    Recurrent/RT leg    CKD (chronic kidney disease) stage 3, GFR 30-59 ml/min (H)     Clavicle fracture 1991    Grade III AC separation    Diabetes (H)     Diverticulosis of colon     ED (erectile dysfunction)     GERD (gastroesophageal reflux disease)     Hyperlipidemia LDL goal < 130 10/01/1997    Hypertension     Latent tuberculosis infection     S/p 12 week course of weekly INH 900mg + rifapentine 900mg (-last dose on 24)    Lumbar disc herniation 1992    Malignant neoplasm of urinary bladder (H)     Obesity     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     Polyp of colon     Renal stone     Sensorineural hearing loss, unilateral 1997    Sleep apnea     Uses CPAP    Uric acid retention 2001      Past Surgical History:   Procedure Laterality Date    HC KNEE SCOPE,MED/LAT MENISECTOMY  10/23/2013    Right, with partial medial ONLY, chondoplasty    HC NASAL SURG PROC UNLISTED  1978    nasal bridge fracture surgery      ROTATOR CUFF REPAIR RT/LT  1999    Left    s/p TURBT      TONSILLECTOMY & ADENOIDECTOMY  Age 13    URETEROSTOMY  2001      Allergies   Allergen Reactions    Prednisone Dizziness    Lisinopril Cough    Metformin Diarrhea    Other Drug Allergy (See Comments) Swelling and Rash     Epoxy harden resins      Social History     Tobacco Use    Smoking status: Former     Current packs/day: 0.00     Types: Cigarettes     Start date: 8/10/1963     Quit date: 8/10/1993     Years since quittin.9    Smokeless tobacco: Never   Substance Use  "Topics    Alcohol use: No      Wt Readings from Last 1 Encounters:   07/24/25 107.7 kg (237 lb 6.4 oz)        Anesthesia Evaluation            ROS/MED HX  ENT/Pulmonary:     (+) sleep apnea,                                       Neurologic:       Cardiovascular:     (+)  hypertension- -   -  - -                                      METS/Exercise Tolerance:     Hematologic:       Musculoskeletal:       GI/Hepatic:     (+) GERD,                   Renal/Genitourinary:     (+) renal disease,             Endo:     (+)  type II DM,             Obesity,       Psychiatric/Substance Use:       Infectious Disease:       Malignancy:       Other:              Physical Exam  Airway  Mallampati: II  TM distance: >3 FB  Neck ROM: full  Mouth opening: >= 4 cm    Cardiovascular - normal exam   Dental     Pulmonary - normal exam      Neurological - normal exam  He appears awake, alert and oriented x3.    Other Findings       OUTSIDE LABS:  CBC:   Lab Results   Component Value Date    WBC 9.2 10/12/2017    WBC 10.2@ 04/27/2007    HGB 15.6 10/12/2017    HGB 15.1@ 04/27/2007    HCT 43.3 10/12/2017    HCT 43.30@ 04/27/2007     10/12/2017    .0@ 04/27/2007     BMP:   Lab Results   Component Value Date     07/28/2023     01/21/2019    POTASSIUM 4.2 07/28/2023    POTASSIUM 4.5 06/21/2019    CHLORIDE 102 07/28/2023    CHLORIDE 102 01/21/2019    CO2 24 07/28/2023    CO2 33 (H) 01/21/2019    BUN 28.6 (H) 07/28/2023    BUN 17 01/21/2019    CR 1.63 (H) 07/28/2023    CR 1.1 06/21/2019     (H) 07/24/2025     (H) 07/28/2023     COAGS:   Lab Results   Component Value Date    INR 1.2 (A) 07/21/2025     POC: No results found for: \"BGM\", \"HCG\", \"HCGS\"  HEPATIC:   Lab Results   Component Value Date    ALBUMIN 4.3 07/28/2023    PROTTOTAL 6.9 07/28/2023    ALT 19 07/28/2023    AST 24 07/28/2023    ALKPHOS 78 07/28/2023    BILITOTAL 0.6 07/28/2023     OTHER:   Lab Results   Component Value Date    A1C 7.6 (H) " 07/28/2023    DANIAL 9.7 07/28/2023    TSH 3.12 01/21/2019       Anesthesia Plan    ASA Status:  2       Anesthesia Type: General.  Airway: oral.  Induction: intravenous.  Maintenance: Balanced.   Techniques and Equipment:       - Monitoring Plan: standard ASA monitoring     Consents    Anesthesia Plan(s) and associated risks, benefits, and realistic alternatives discussed. Questions answered and patient/representative(s) expressed understanding.     - Discussed: CRNA     - Discussed with:  Patient        - Pt is DNR/DNI Status: no DNR          Postoperative Care    Pain management: plan for postoperative opioid use.     Comments:                   Alex Sinclair MD    I have reviewed the pertinent notes and labs in the chart from the past 30 days and (re)examined the patient.  Any updates or changes from those notes are reflected in this note.    Clinically Significant Risk Factors Present on Admission                 # Drug Induced Platelet Defect: home medication list includes an antiplatelet medication   # Hypertension: Noted on problem list

## 2025-07-24 NOTE — ANESTHESIA POSTPROCEDURE EVALUATION
Patient: Neville Bonilla    Procedure: Procedure(s):  FUNCTIONAL ENDOSCOPIC SINUS SURGERY, WITH BALLOON SINUPLASTY, WITH IMAGING GUIDANCE, RIGHT and LEFT maxillary, RIGHT sphenoid, NASAL SEPTOPLASTY with removal of septal cartiage  POLYPECTOMY, NASAL CAVITY, ENDOSCOPIC       Anesthesia Type:  General    Note:  Disposition: Outpatient   Postop Pain Control: Uneventful            Sign Out: Well controlled pain   PONV: No   Neuro/Psych: Uneventful            Sign Out: Acceptable/Baseline neuro status   Airway/Respiratory: Uneventful            Sign Out: Acceptable/Baseline resp. status   CV/Hemodynamics: Uneventful            Sign Out: Acceptable CV status; No obvious hypovolemia; No obvious fluid overload   Other NRE: NONE   DID A NON-ROUTINE EVENT OCCUR? No           Last vitals:  Vitals Value Taken Time   /88 07/24/25 12:52   Temp 36.2  C (97.1  F) 07/24/25 12:52   Pulse 65 07/24/25 12:59   Resp 12 07/24/25 12:59   SpO2 93 % 07/24/25 12:59   Vitals shown include unfiled device data.    Electronically Signed By: Jakub Crawley MD  July 24, 2025  1:01 PM

## 2025-07-26 LAB
BACTERIA FLD CULT: NORMAL
BACTERIA FLD CULT: NORMAL

## 2025-07-27 LAB — BACTERIA FLD CULT: NORMAL

## 2025-07-29 ENCOUNTER — OFFICE VISIT (OUTPATIENT)
Dept: OTOLARYNGOLOGY | Facility: CLINIC | Age: 80
End: 2025-07-29
Attending: FAMILY MEDICINE
Payer: COMMERCIAL

## 2025-07-29 DIAGNOSIS — Z09 POSTOP CHECK: Primary | ICD-10-CM

## 2025-07-29 DIAGNOSIS — J34.89 NASAL DRAINAGE: ICD-10-CM

## 2025-07-29 PROCEDURE — 31237 NSL/SINS NDSC SURG BX POLYPC: CPT | Mod: LT | Performed by: PHYSICIAN ASSISTANT

## 2025-07-29 NOTE — PROGRESS NOTES
Assessment & Plan        Doing very well, some debridement today,  5-6 week recheck    Problem List Items Addressed This Visit    None  Visit Diagnoses         Postop check    -  Primary      Nasal drainage        Relevant Orders    NASAL/SINUS SCOPE WITH BIOPSY/POLYPECT/DEBRIDE,ENT (Completed)                10 minutes spent on the date of the encounter doing chart review, history and exam, documentation and further activities per the note  {     [facility]    Subjective     HPI-    Last Visit w/ Dr Lopez 7/24 for   Procedure Laterality Anesthesia   FUNCTIONAL ENDOSCOPIC SINUS SURGERY, WITH BALLOON SINUPLASTY, WITH IMAGING GUIDANCE, RIGHT and LEFT maxillary, RIGHT sphenoid, Bilateral General   POLYPECTOMY, NASAL CAVITY, ENDOSCOPIC Bilateral General   NASAL SEPTOPLASTY with removal of septal cartiage            Today, reports for post op check doing very well, intermittent 2/10 frontal headache.  Using saline rinses.  Breathing well through nose         Review of Systems   ENT as above      Objective    There were no vitals taken for this visit.    Physical Exam     Nasal Endoscopy -     The patient was counseled that their symptoms and history require a direct visualization with an endoscopy procedure.  They understood and we proceeded with a fiberoptic examination.  First I sprayed both sides of the nose with a mixture of lidocaine and oxymetazline.  I then passed the scope through the nasal cavity.  Color photographs were taken for the permanent medical record.  The interior of the nasal cavity and the middle and superior meatus, the turbinates, and the sphenoethmoid recess was unremarkable except for some thin mucous drainage on left which was suctioned,  and a patch of thicker crusts on right between the middle turbinate and the spetum which was also removed.  .  The nasopharynx was mucosally covered and symmetric. The Eustachian tube openings were  unobstructed.    Patient tolerated well.      Right middle        Right middle after more suction        Left middle        Left middle

## 2025-07-31 LAB
BACTERIA FLD CULT: ABNORMAL
BACTERIA FLD CULT: ABNORMAL
BACTERIA FLD CULT: NORMAL

## 2025-08-01 LAB — BACTERIA FLD CULT: ABNORMAL

## 2025-08-07 LAB
PATH REPORT.ADDENDUM SPEC: NORMAL
PATH REPORT.COMMENTS IMP SPEC: NORMAL
PATH REPORT.FINAL DX SPEC: NORMAL
PATH REPORT.GROSS SPEC: NORMAL
PATH REPORT.INTRAOP OBS SPEC DOC: NORMAL
PATH REPORT.MICROSCOPIC SPEC OTHER STN: NORMAL
PATH REPORT.RELEVANT HX SPEC: NORMAL
PHOTO IMAGE: NORMAL

## 2025-08-07 PROCEDURE — 88331 PATH CONSLTJ SURG 1 BLK 1SPC: CPT | Mod: 26 | Performed by: PATHOLOGY

## 2025-08-07 PROCEDURE — 88312 SPECIAL STAINS GROUP 1: CPT | Mod: 26 | Performed by: PATHOLOGY

## 2025-08-07 PROCEDURE — 88304 TISSUE EXAM BY PATHOLOGIST: CPT | Mod: 26 | Performed by: PATHOLOGY

## 2025-08-07 PROCEDURE — 88305 TISSUE EXAM BY PATHOLOGIST: CPT | Mod: 26 | Performed by: PATHOLOGY

## 2025-08-21 LAB — BACTERIA FLD CULT: ABNORMAL

## 2025-09-04 LAB — BACTERIA FLD CULT: ABNORMAL

## (undated) DEVICE — SYR 30ML LL W/O NDL 302832

## (undated) DEVICE — Device

## (undated) DEVICE — SPLINT INTRANASAL POSISEPX GEL SPONGE 9210584

## (undated) DEVICE — SOLUTION IV 0.9% NACL 1000ML E8000

## (undated) DEVICE — SOLUTION IRRIGATION 0.9% NACL 1000ML BOTTLE R5200-01

## (undated) DEVICE — NDS RELIEVA TRACT 16MM X 40MM RT1640A

## (undated) DEVICE — ANTIFOG SOLUTION W/FOAM PAD 31142527

## (undated) DEVICE — SINUS BALL INFLATION DEVICE BID30

## (undated) DEVICE — SOLUTION WATER 1000ML BOTTLE R5000-01

## (undated) DEVICE — TRACKER ENT OTS INSTRUMENT FUSION 9733533

## (undated) DEVICE — PREP POVIDONE-IODINE 10% SOLUTION 4OZ BOTTLE MDS093944

## (undated) DEVICE — VIAL DECANTER STERILE WHITE DYNJDEC06

## (undated) DEVICE — WAND COBLATION TURBINATOR RDC ARIS 72290113

## (undated) DEVICE — ADH LIQUID MASTISOL TOPICAL VIAL 2-3ML 0523-48

## (undated) DEVICE — KIT INFLATOR BALLOON 18INFKIT

## (undated) DEVICE — CUSTOM PACK MINOR SBA5BMNHEA

## (undated) DEVICE — DRSG TEGADERM 2 3/8X2 3/4" 1624W

## (undated) DEVICE — SINUS CATH BALLOON RELIEVA SPINPLUS FRONTAL RSP0616MFS

## (undated) DEVICE — TRAY PREP DRY SKIN SCRUB 067

## (undated) DEVICE — DRAPE U SPLIT 74X120" 29440

## (undated) DEVICE — TUBING SUCTION MEDI-VAC 1/4"X20' N620A

## (undated) DEVICE — PREP DYNA-HEX 4% CHG SCRUB 4OZ BOTTLE MDS098710

## (undated) DEVICE — SYR 03ML LL W/O NDL 309657

## (undated) DEVICE — TRACKER PATIENT NON-INVASIVE AXIEM 9734887

## (undated) DEVICE — DRAPE SLEEVE 599

## (undated) DEVICE — NDL 27GA 1 1/2" 1188827112

## (undated) DEVICE — GLOVE SURG PI ULTRA TOUCH M SZ 7-1/2 LF

## (undated) DEVICE — ESU GROUND PAD ADULT REM W/15' CORD E7507DB

## (undated) DEVICE — KIT TURNOVER FAIRVIEW SOUTHDALE FULL SP3889

## (undated) RX ORDER — FENTANYL CITRATE 50 UG/ML
INJECTION, SOLUTION INTRAMUSCULAR; INTRAVENOUS
Status: DISPENSED
Start: 2025-07-24

## (undated) RX ORDER — EPINEPHRINE 1 MG/ML
INJECTION, SOLUTION INTRAMUSCULAR; SUBCUTANEOUS
Status: DISPENSED
Start: 2025-07-24

## (undated) RX ORDER — OXYMETAZOLINE HYDROCHLORIDE 0.05 G/100ML
SPRAY NASAL
Status: DISPENSED
Start: 2025-07-24

## (undated) RX ORDER — PROPOFOL 10 MG/ML
INJECTION, EMULSION INTRAVENOUS
Status: DISPENSED
Start: 2025-07-24

## (undated) RX ORDER — LIDOCAINE HYDROCHLORIDE AND EPINEPHRINE 10; 10 MG/ML; UG/ML
INJECTION, SOLUTION INFILTRATION; PERINEURAL
Status: DISPENSED
Start: 2025-07-24